# Patient Record
Sex: FEMALE | Race: WHITE | NOT HISPANIC OR LATINO
[De-identification: names, ages, dates, MRNs, and addresses within clinical notes are randomized per-mention and may not be internally consistent; named-entity substitution may affect disease eponyms.]

---

## 2018-12-26 PROBLEM — Z00.00 ENCOUNTER FOR PREVENTIVE HEALTH EXAMINATION: Status: ACTIVE | Noted: 2018-12-26

## 2018-12-28 ENCOUNTER — APPOINTMENT (OUTPATIENT)
Dept: INTERNAL MEDICINE | Facility: CLINIC | Age: 68
End: 2018-12-28
Payer: MEDICARE

## 2018-12-28 VITALS
SYSTOLIC BLOOD PRESSURE: 110 MMHG | WEIGHT: 147 LBS | OXYGEN SATURATION: 96 % | BODY MASS INDEX: 26.05 KG/M2 | TEMPERATURE: 97.9 F | HEIGHT: 63 IN | DIASTOLIC BLOOD PRESSURE: 72 MMHG | HEART RATE: 80 BPM

## 2018-12-28 DIAGNOSIS — M19.90 UNSPECIFIED OSTEOARTHRITIS, UNSPECIFIED SITE: ICD-10-CM

## 2018-12-28 DIAGNOSIS — Z80.1 FAMILY HISTORY OF MALIGNANT NEOPLASM OF TRACHEA, BRONCHUS AND LUNG: ICD-10-CM

## 2018-12-28 DIAGNOSIS — Z83.3 FAMILY HISTORY OF DIABETES MELLITUS: ICD-10-CM

## 2018-12-28 DIAGNOSIS — Z80.6 FAMILY HISTORY OF LEUKEMIA: ICD-10-CM

## 2018-12-28 PROCEDURE — 99213 OFFICE O/P EST LOW 20 MIN: CPT

## 2019-02-07 ENCOUNTER — RECORD ABSTRACTING (OUTPATIENT)
Age: 69
End: 2019-02-07

## 2019-02-07 ENCOUNTER — NON-APPOINTMENT (OUTPATIENT)
Age: 69
End: 2019-02-07

## 2019-02-07 ENCOUNTER — APPOINTMENT (OUTPATIENT)
Dept: INTERNAL MEDICINE | Facility: CLINIC | Age: 69
End: 2019-02-07
Payer: MEDICARE

## 2019-02-07 ENCOUNTER — APPOINTMENT (OUTPATIENT)
Dept: INTERNAL MEDICINE | Facility: CLINIC | Age: 69
End: 2019-02-07

## 2019-02-07 VITALS
SYSTOLIC BLOOD PRESSURE: 112 MMHG | WEIGHT: 148 LBS | OXYGEN SATURATION: 97 % | TEMPERATURE: 97.9 F | HEIGHT: 62 IN | BODY MASS INDEX: 27.23 KG/M2 | HEART RATE: 74 BPM | DIASTOLIC BLOOD PRESSURE: 70 MMHG

## 2019-02-07 DIAGNOSIS — N64.4 MASTODYNIA: ICD-10-CM

## 2019-02-07 DIAGNOSIS — Z78.9 OTHER SPECIFIED HEALTH STATUS: ICD-10-CM

## 2019-02-07 DIAGNOSIS — S32.409A UNSPECIFIED FRACTURE OF UNSPECIFIED ACETABULUM, INITIAL ENCOUNTER FOR CLOSED FRACTURE: ICD-10-CM

## 2019-02-07 DIAGNOSIS — R07.81 PLEURODYNIA: ICD-10-CM

## 2019-02-07 DIAGNOSIS — Z87.891 PERSONAL HISTORY OF NICOTINE DEPENDENCE: ICD-10-CM

## 2019-02-07 DIAGNOSIS — M79.602 PAIN IN LEFT ARM: ICD-10-CM

## 2019-02-07 DIAGNOSIS — R42 DIZZINESS AND GIDDINESS: ICD-10-CM

## 2019-02-07 DIAGNOSIS — M25.569 PAIN IN UNSPECIFIED KNEE: ICD-10-CM

## 2019-02-07 DIAGNOSIS — T07.XXXA UNSPECIFIED MULTIPLE INJURIES, INITIAL ENCOUNTER: ICD-10-CM

## 2019-02-07 DIAGNOSIS — Z86.39 PERSONAL HISTORY OF OTHER ENDOCRINE, NUTRITIONAL AND METABOLIC DISEASE: ICD-10-CM

## 2019-02-07 DIAGNOSIS — M25.519 PAIN IN UNSPECIFIED SHOULDER: ICD-10-CM

## 2019-02-07 DIAGNOSIS — Z87.898 PERSONAL HISTORY OF OTHER SPECIFIED CONDITIONS: ICD-10-CM

## 2019-02-07 DIAGNOSIS — G89.29 PAIN IN UNSPECIFIED KNEE: ICD-10-CM

## 2019-02-07 DIAGNOSIS — M79.622 PAIN IN LEFT UPPER ARM: ICD-10-CM

## 2019-02-07 DIAGNOSIS — M25.559 PAIN IN UNSPECIFIED HIP: ICD-10-CM

## 2019-02-07 DIAGNOSIS — Z86.79 PERSONAL HISTORY OF OTHER DISEASES OF THE CIRCULATORY SYSTEM: ICD-10-CM

## 2019-02-07 DIAGNOSIS — Z87.2 PERSONAL HISTORY OF DISEASES OF THE SKIN AND SUBCUTANEOUS TISSUE: ICD-10-CM

## 2019-02-07 DIAGNOSIS — Z87.39 PERSONAL HISTORY OF OTHER DISEASES OF THE MUSCULOSKELETAL SYSTEM AND CONNECTIVE TISSUE: ICD-10-CM

## 2019-02-07 DIAGNOSIS — Z82.69 FAMILY HISTORY OF OTHER DISEASES OF THE MUSCULOSKELETAL SYSTEM AND CONNECTIVE TISSUE: ICD-10-CM

## 2019-02-07 DIAGNOSIS — Z60.2 PROBLEMS RELATED TO LIVING ALONE: ICD-10-CM

## 2019-02-07 PROCEDURE — 82270 OCCULT BLOOD FECES: CPT

## 2019-02-07 PROCEDURE — 36415 COLL VENOUS BLD VENIPUNCTURE: CPT

## 2019-02-07 PROCEDURE — 99213 OFFICE O/P EST LOW 20 MIN: CPT | Mod: 25

## 2019-02-07 PROCEDURE — G0439: CPT | Mod: 25

## 2019-02-07 PROCEDURE — 93000 ELECTROCARDIOGRAM COMPLETE: CPT

## 2019-02-07 SDOH — SOCIAL STABILITY - SOCIAL INSECURITY: PROBLEMS RELATED TO LIVING ALONE: Z60.2

## 2019-02-13 ENCOUNTER — APPOINTMENT (OUTPATIENT)
Dept: CARDIOLOGY | Facility: CLINIC | Age: 69
End: 2019-02-13

## 2019-02-15 ENCOUNTER — APPOINTMENT (OUTPATIENT)
Dept: ORTHOPEDIC SURGERY | Facility: CLINIC | Age: 69
End: 2019-02-15
Payer: MEDICARE

## 2019-02-15 VITALS — WEIGHT: 147 LBS | BODY MASS INDEX: 26.05 KG/M2 | HEIGHT: 63 IN

## 2019-02-15 DIAGNOSIS — M75.112 INCOMPLETE ROTATOR CUFF TEAR OR RUPTURE OF LEFT SHOULDER, NOT SPECIFIED AS TRAUMATIC: ICD-10-CM

## 2019-02-15 DIAGNOSIS — M19.012 PRIMARY OSTEOARTHRITIS, LEFT SHOULDER: ICD-10-CM

## 2019-02-15 PROCEDURE — 20610 DRAIN/INJ JOINT/BURSA W/O US: CPT | Mod: LT

## 2019-02-15 PROCEDURE — 99203 OFFICE O/P NEW LOW 30 MIN: CPT | Mod: 25

## 2019-02-15 PROCEDURE — 73030 X-RAY EXAM OF SHOULDER: CPT | Mod: 26

## 2019-02-15 NOTE — HISTORY OF PRESENT ILLNESS
[de-identified] : Patient reports moderate lateral left shoulder pain for 2 months.   Pain with overhead activity and extension.  Tingling radiates to elbow and hand.  Pain laying on side at night.  Patient fell about 4 years ago on left side.  Patient bring MRI report of left shoulder 6/15/15.  Patient performed PT which resolved the pain. NSAID use helps pain.

## 2019-02-15 NOTE — CONSULT LETTER
[Dear  ___] : Dear  [unfilled], [Consult Letter:] : I had the pleasure of evaluating your patient, [unfilled]. [Please see my note below.] : Please see my note below. [Sincerely,] : Sincerely, [FreeTextEntry2] : Dr. Ani Sloan\par [address] [facility], [address]nd floor\[place] [address]\par Phone: [phone]\par Fax: [phone]\par  [FreeTextEntry3] : Sudhir Perrin MD

## 2019-02-15 NOTE — PHYSICAL EXAM
[Normal] : Gait: normal [UE] : Sensory: Intact in bilateral upper extremities [Bicep] : biceps 2+ and symmetric bilaterally [Rad] : radial 2+ and symmetric bilaterally [Shoulder Instab Anterior Apprehension (Crank) Test Left] : negative Apprehension test [FreeTextEntry2] : Pain on palpation\par right no\par left no\par ROM\par right 180 ER 80\par left 150 ER 40\par Strength\par right 5/5\par left 5/5\par Skin intact

## 2019-02-17 LAB
ALBUMIN SERPL ELPH-MCNC: 4.1 G/DL
ALP BLD-CCNC: 75 U/L
ALT SERPL-CCNC: 14 U/L
ANION GAP SERPL CALC-SCNC: 12 MMOL/L
AST SERPL-CCNC: 20 U/L
BASOPHILS # BLD AUTO: 0.01 K/UL
BASOPHILS NFR BLD AUTO: 0.2 %
BILIRUB SERPL-MCNC: 0.4 MG/DL
BUN SERPL-MCNC: 20 MG/DL
CALCIUM SERPL-MCNC: 9.7 MG/DL
CHLORIDE SERPL-SCNC: 105 MMOL/L
CHOLEST SERPL-MCNC: 239 MG/DL
CHOLEST/HDLC SERPL: 2 RATIO
CO2 SERPL-SCNC: 27 MMOL/L
CREAT SERPL-MCNC: 0.54 MG/DL
EOSINOPHIL # BLD AUTO: 0.11 K/UL
EOSINOPHIL NFR BLD AUTO: 1.9 %
GLUCOSE SERPL-MCNC: 103 MG/DL
HBA1C MFR BLD HPLC: 5.5 %
HCT VFR BLD CALC: 40.7 %
HDLC SERPL-MCNC: 117 MG/DL
HGB BLD-MCNC: 12.4 G/DL
IMM GRANULOCYTES NFR BLD AUTO: 0.2 %
LDLC SERPL CALC-MCNC: 111 MG/DL
LYMPHOCYTES # BLD AUTO: 2.01 K/UL
LYMPHOCYTES NFR BLD AUTO: 35 %
MAN DIFF?: NORMAL
MCHC RBC-ENTMCNC: 30.5 GM/DL
MCHC RBC-ENTMCNC: 30.7 PG
MCV RBC AUTO: 100.7 FL
MONOCYTES # BLD AUTO: 0.36 K/UL
MONOCYTES NFR BLD AUTO: 6.3 %
NEUTROPHILS # BLD AUTO: 3.25 K/UL
NEUTROPHILS NFR BLD AUTO: 56.4 %
PLATELET # BLD AUTO: 254 K/UL
POTASSIUM SERPL-SCNC: 5 MMOL/L
PROT SERPL-MCNC: 6.5 G/DL
RBC # BLD: 4.04 M/UL
RBC # FLD: 15.7 %
SODIUM SERPL-SCNC: 144 MMOL/L
TRIGL SERPL-MCNC: 56 MG/DL
TSH SERPL-ACNC: 1.62 UIU/ML
WBC # FLD AUTO: 5.75 K/UL

## 2019-02-23 ENCOUNTER — APPOINTMENT (OUTPATIENT)
Dept: INTERNAL MEDICINE | Facility: CLINIC | Age: 69
End: 2019-02-23
Payer: MEDICARE

## 2019-02-23 ENCOUNTER — LABORATORY RESULT (OUTPATIENT)
Age: 69
End: 2019-02-23

## 2019-02-23 PROCEDURE — 36415 COLL VENOUS BLD VENIPUNCTURE: CPT

## 2019-03-13 ENCOUNTER — RESULT REVIEW (OUTPATIENT)
Age: 69
End: 2019-03-13

## 2019-04-09 ENCOUNTER — MED ADMIN CHARGE (OUTPATIENT)
Age: 69
End: 2019-04-09

## 2019-04-09 ENCOUNTER — APPOINTMENT (OUTPATIENT)
Dept: INTERNAL MEDICINE | Facility: CLINIC | Age: 69
End: 2019-04-09
Payer: MEDICARE

## 2019-04-09 VITALS
HEART RATE: 83 BPM | WEIGHT: 151 LBS | DIASTOLIC BLOOD PRESSURE: 70 MMHG | SYSTOLIC BLOOD PRESSURE: 100 MMHG | HEIGHT: 63 IN | OXYGEN SATURATION: 95 % | TEMPERATURE: 97.8 F | BODY MASS INDEX: 26.75 KG/M2

## 2019-04-09 VITALS — HEART RATE: 87 BPM | OXYGEN SATURATION: 99 %

## 2019-04-09 DIAGNOSIS — Z87.09 PERSONAL HISTORY OF OTHER DISEASES OF THE RESPIRATORY SYSTEM: ICD-10-CM

## 2019-04-09 LAB
FOLATE SERPL-MCNC: 13.3 NG/ML
VIT B12 SERPL-MCNC: 627 PG/ML

## 2019-04-09 PROCEDURE — 99354: CPT

## 2019-04-09 PROCEDURE — G0442 ANNUAL ALCOHOL SCREEN 15 MIN: CPT | Mod: 59

## 2019-04-09 PROCEDURE — G0444 DEPRESSION SCREEN ANNUAL: CPT | Mod: 59

## 2019-04-09 PROCEDURE — 94640 AIRWAY INHALATION TREATMENT: CPT

## 2019-04-09 PROCEDURE — 99215 OFFICE O/P EST HI 40 MIN: CPT | Mod: 25

## 2019-04-09 RX ORDER — IPRATROPIUM BROMIDE AND ALBUTEROL SULFATE 2.5; .5 MG/3ML; MG/3ML
0.5-2.5 (3) SOLUTION RESPIRATORY (INHALATION)
Qty: 0 | Refills: 0 | Status: COMPLETED | OUTPATIENT
Start: 2019-04-09

## 2019-04-09 RX ADMIN — IPRATROPIUM BROMIDE AND ALBUTEROL SULFATE 1 MG/3ML: 2.5; .5 SOLUTION RESPIRATORY (INHALATION) at 00:00

## 2019-04-09 NOTE — HISTORY OF PRESENT ILLNESS
[FreeTextEntry8] : coughing,   nose running,   clear   rhinorhea,   eyes itchy,  eyes runny. \par sore throat. \par feel wheezy  and tight in chest. \par had similar illness in 12/18  treated by dr. amador with jessie. \par \par Patient is having coughing fits repeatedly she feels wheezing and tightness in her chest. Tightness with breathing.\par \par patient also tripped at work and injured her left foot.  no sweling.

## 2019-04-09 NOTE — PHYSICAL EXAM
[No Acute Distress] : no acute distress [Well Nourished] : well nourished [Well Developed] : well developed [Well-Appearing] : well-appearing [PERRL] : pupils equal round and reactive to light [Normal Sclera/Conjunctiva] : normal sclera/conjunctiva [EOMI] : extraocular movements intact [Normal Outer Ear/Nose] : the outer ears and nose were normal in appearance [No JVD] : no jugular venous distention [Normal Oropharynx] : the oropharynx was normal [Supple] : supple [Thyroid Normal, No Nodules] : the thyroid was normal and there were no nodules present [No Lymphadenopathy] : no lymphadenopathy [Normal Rate] : normal rate  [Normal S1, S2] : normal S1 and S2 [Regular Rhythm] : with a regular rhythm [No Murmur] : no murmur heard [No Carotid Bruits] : no carotid bruits [No Varicosities] : no varicosities [No Abdominal Bruit] : a ~M bruit was not heard ~T in the abdomen [No Edema] : there was no peripheral edema [Pedal Pulses Present] : the pedal pulses are present [No Extremity Clubbing/Cyanosis] : no extremity clubbing/cyanosis [No Palpable Aorta] : no palpable aorta [Soft] : abdomen soft [No Masses] : no abdominal mass palpated [Non Tender] : non-tender [Non-distended] : non-distended [Normal Bowel Sounds] : normal bowel sounds [Normal Posterior Cervical Nodes] : no posterior cervical lymphadenopathy [No HSM] : no HSM [Normal Anterior Cervical Nodes] : no anterior cervical lymphadenopathy [No Spinal Tenderness] : no spinal tenderness [No CVA Tenderness] : no CVA  tenderness [Grossly Normal Strength/Tone] : grossly normal strength/tone [No Rash] : no rash [No Joint Swelling] : no joint swelling [Coordination Grossly Intact] : coordination grossly intact [Normal Gait] : normal gait [No Focal Deficits] : no focal deficits [Normal Affect] : the affect was normal [Deep Tendon Reflexes (DTR)] : deep tendon reflexes were 2+ and symmetric [Normal Insight/Judgement] : insight and judgment were intact [de-identified] : Reviewed previous notes and Dr. Sloan/   Scattered wheezes and rhonchi in all lung fields with good air movement noted. [de-identified] : left foot without swelling or deformity

## 2019-04-16 ENCOUNTER — APPOINTMENT (OUTPATIENT)
Dept: INTERNAL MEDICINE | Facility: CLINIC | Age: 69
End: 2019-04-16
Payer: MEDICARE

## 2019-04-16 VITALS — DIASTOLIC BLOOD PRESSURE: 80 MMHG | SYSTOLIC BLOOD PRESSURE: 122 MMHG

## 2019-04-16 PROCEDURE — 99213 OFFICE O/P EST LOW 20 MIN: CPT

## 2019-04-17 ENCOUNTER — RESULT REVIEW (OUTPATIENT)
Age: 69
End: 2019-04-17

## 2019-04-19 NOTE — HISTORY OF PRESENT ILLNESS
[FreeTextEntry8] : finished antbiotic for bronchitis.\par \par gave nebs last visit. \par \par rx qvar,  but didn't fill because wasn't covered.\par \par back at work.   foot still hurts.  black and blue.  \par \par ankle hurts.

## 2019-04-19 NOTE — PHYSICAL EXAM
[No Acute Distress] : no acute distress [Well Nourished] : well nourished [Well Developed] : well developed [de-identified] : black and blue on distal foot.  ankle full rom,  no swelling or pain

## 2019-06-07 PROBLEM — R42 DIZZINESS: Status: ACTIVE | Noted: 2019-02-07

## 2019-06-07 LAB
DATE COLLECTED: NORMAL
HEMOCCULT SP1 STL QL: NEGATIVE
QUALITY CONTROL: YES

## 2019-06-07 NOTE — REVIEW OF SYSTEMS
[Sore Throat] : sore throat [Cough] : cough [Chills] : chills [Fatigue] : fatigue [Fever] : no fever [Earache] : no earache [Chest Pain] : no chest pain [Postnasal Drip] : no postnasal drip [Lower Ext Edema] : no lower extremity edema [Palpitations] : no palpitations [Nausea] : no nausea [Abdominal Pain] : no abdominal pain [Diarrhea] : diarrhea [Dysuria] : no dysuria [Skin Rash] : no skin rash [Headache] : no headache [FreeTextEntry4] : she had sorethroat 1 week ago [FreeTextEntry6] : with clear mucous; says cough is wet [de-identified] : feels weak/tired

## 2019-06-07 NOTE — HISTORY OF PRESENT ILLNESS
[FreeTextEntry8] : Patient comes in c/o\par cough x 1 week,  wheezing.  She says her cough is wet\par clear mucous\par no fever \par Had chills\par had a sorethroat 1 week ago; it has  resolved\par She feels weak/tired.\par no facial pain\par

## 2019-06-07 NOTE — PHYSICAL EXAM
[No Acute Distress] : no acute distress [Normal Outer Ear/Nose] : the outer ears and nose were normal in appearance [Normal Oropharynx] : the oropharynx was normal [No Respiratory Distress] : no respiratory distress  [Normal Rate] : normal rate  [Regular Rhythm] : with a regular rhythm [Normal S1, S2] : normal S1 and S2 [No Murmur] : no murmur heard [No Edema] : there was no peripheral edema [Soft] : abdomen soft [Non Tender] : non-tender [Non-distended] : non-distended [de-identified] : scant wheezing anteriorly; otherwise lungs are clear; no rales; wet cough in office

## 2019-06-11 ENCOUNTER — APPOINTMENT (OUTPATIENT)
Dept: INTERNAL MEDICINE | Facility: CLINIC | Age: 69
End: 2019-06-11
Payer: MEDICARE

## 2019-06-11 PROCEDURE — 90732 PPSV23 VACC 2 YRS+ SUBQ/IM: CPT

## 2019-06-11 PROCEDURE — G0009: CPT

## 2019-06-25 ENCOUNTER — APPOINTMENT (OUTPATIENT)
Dept: INTERNAL MEDICINE | Facility: CLINIC | Age: 69
End: 2019-06-25

## 2019-06-25 RX ORDER — AZITHROMYCIN 250 MG/1
250 TABLET, FILM COATED ORAL
Qty: 6 | Refills: 0 | Status: DISCONTINUED | COMMUNITY
Start: 2019-04-09 | End: 2019-06-25

## 2019-08-02 ENCOUNTER — APPOINTMENT (OUTPATIENT)
Dept: INTERNAL MEDICINE | Facility: CLINIC | Age: 69
End: 2019-08-02
Payer: MEDICARE

## 2019-08-02 VITALS
SYSTOLIC BLOOD PRESSURE: 140 MMHG | HEART RATE: 78 BPM | WEIGHT: 150 LBS | TEMPERATURE: 98 F | HEIGHT: 63 IN | BODY MASS INDEX: 26.58 KG/M2 | OXYGEN SATURATION: 98 % | DIASTOLIC BLOOD PRESSURE: 80 MMHG

## 2019-08-02 PROCEDURE — 99214 OFFICE O/P EST MOD 30 MIN: CPT

## 2019-08-02 RX ORDER — DOXYCYCLINE HYCLATE 100 MG/1
100 CAPSULE ORAL TWICE DAILY
Qty: 20 | Refills: 0 | Status: COMPLETED | COMMUNITY
Start: 2019-08-02 | End: 2019-08-12

## 2019-08-02 NOTE — HISTORY OF PRESENT ILLNESS
[Other: _____] : [unfilled] [FreeTextEntry1] : Insect bite [de-identified] : Patient comes c/o that she was bitten by an Insect last night while visiting her neighbor.  She felt a sting on her legs.  Thereafter she developed itching and swelling, redness in left leg. She took Benadryl with some relief.

## 2019-08-02 NOTE — REVIEW OF SYSTEMS
[Chills] : chills [Lower Ext Edema] : lower extremity edema [Fever] : no fever [Chest Pain] : no chest pain [Shortness Of Breath] : no shortness of breath [FreeTextEntry9] : Swelling and redness in left leg [de-identified] : red bumps on legs

## 2019-08-02 NOTE — PHYSICAL EXAM
[No Lymphadenopathy] : no lymphadenopathy [No Respiratory Distress] : no respiratory distress  [Regular Rhythm] : with a regular rhythm [Normal Rate] : normal rate  [Normal S1, S2] : normal S1 and S2 [No Acute Distress] : no acute distress [de-identified] : positive edema in left leg [de-identified] : Skin is erythematous and hot to the touch; red papules on leg

## 2019-08-07 ENCOUNTER — APPOINTMENT (OUTPATIENT)
Dept: INTERNAL MEDICINE | Facility: CLINIC | Age: 69
End: 2019-08-07
Payer: MEDICARE

## 2019-08-07 ENCOUNTER — RESULT REVIEW (OUTPATIENT)
Age: 69
End: 2019-08-07

## 2019-08-07 VITALS
SYSTOLIC BLOOD PRESSURE: 120 MMHG | DIASTOLIC BLOOD PRESSURE: 82 MMHG | OXYGEN SATURATION: 98 % | HEART RATE: 78 BPM | BODY MASS INDEX: 26.58 KG/M2 | HEIGHT: 63 IN | TEMPERATURE: 98.2 F | WEIGHT: 150 LBS

## 2019-08-07 DIAGNOSIS — R60.0 LOCALIZED EDEMA: ICD-10-CM

## 2019-08-07 PROCEDURE — 99214 OFFICE O/P EST MOD 30 MIN: CPT

## 2019-08-20 NOTE — HEALTH RISK ASSESSMENT
[Good] : ~his/her~  mood as  good [No falls in past year] : Patient reported no falls in the past year [0] : 2) Feeling down, depressed, or hopeless: Not at all (0) [Patient reported mammogram was normal] : Patient reported mammogram was normal [Patient reported PAP Smear was normal] : Patient reported PAP Smear was normal [HIV test declined] : HIV test declined [Hepatitis C test declined] : Hepatitis C test declined [None] : None [Alone] : lives alone [Employed] : employed [High School] : high school [Single] : single [Feels Safe at Home] : Feels safe at home [Fully functional (bathing, dressing, toileting, transferring, walking, feeding)] : Fully functional (bathing, dressing, toileting, transferring, walking, feeding) [Fully functional (using the telephone, shopping, preparing meals, housekeeping, doing laundry, using] : Fully functional and needs no help or supervision to perform IADLs (using the telephone, shopping, preparing meals, housekeeping, doing laundry, using transportation, managing medications and managing finances) [Smoke Detector] : smoke detector [Carbon Monoxide Detector] : carbon monoxide detector [Patient reported bone density results were abnormal] : Patient reported bone density results were abnormal [Seat Belt] :  uses seat belt [Sunscreen] : uses sunscreen [Discussed at today's visit] : Advance Directives Discussed at today's visit [] : No [de-identified] : socially [Sexually Active] : not sexually active [Change in mental status noted] : No change in mental status noted [Reports changes in vision] : Reports no changes in vision [Reports changes in hearing] : Reports no changes in hearing [Reports changes in dental health] : Reports no changes in dental health [Guns at Home] : no guns at home [Travel to Developing Areas] : does not  travel to developing areas [MammogramDate] : 2018 [TB Exposure] : is not being exposed to tuberculosis [BoneDensityDate] : 2018 [PapSmearDate] : 2017 [BoneDensityComments] : Osteoporosis [ColonoscopyComments] : never perform [de-identified] : last eye exam 1 yr ago [FreeTextEntry2] :

## 2019-08-20 NOTE — PHYSICAL EXAM
[No Acute Distress] : no acute distress [PERRL] : pupils equal round and reactive to light [EOMI] : extraocular movements intact [Normal Oropharynx] : the oropharynx was normal [Normal TMs] : both tympanic membranes were normal [No JVD] : no jugular venous distention [Supple] : supple [No Lymphadenopathy] : no lymphadenopathy [Thyroid Normal, No Nodules] : the thyroid was normal and there were no nodules present [No Respiratory Distress] : no respiratory distress  [Clear to Auscultation] : lungs were clear to auscultation bilaterally [Normal Rate] : normal rate  [Regular Rhythm] : with a regular rhythm [Normal S1, S2] : normal S1 and S2 [No Murmur] : no murmur heard [No Varicosities] : no varicosities [No Edema] : there was no peripheral edema [Normal Appearance] : normal in appearance [No Nipple Discharge] : no nipple discharge [No Axillary Lymphadenopathy] : no axillary lymphadenopathy [Soft] : abdomen soft [Non Tender] : non-tender [Non-distended] : non-distended [No Masses] : no abdominal mass palpated [No HSM] : no HSM [Normal Bowel Sounds] : normal bowel sounds [Normal Sphincter Tone] : normal sphincter tone [No Mass] : no mass [No Rash] : no rash [Normal Gait] : normal gait [Coordination Grossly Intact] : coordination grossly intact [No Focal Deficits] : no focal deficits [Deep Tendon Reflexes (DTR)] : deep tendon reflexes were 2+ and symmetric [Speech Grossly Normal] : speech grossly normal [Normal Affect] : the affect was normal [Alert and Oriented x3] : oriented to person, place, and time [Normal Mood] : the mood was normal [Normal Insight/Judgement] : insight and judgment were intact [No Accessory Muscle Use] : no accessory muscle use [Pedal Pulses Present] : the pedal pulses are present [No CVA Tenderness] : no CVA  tenderness [Stool Occult Blood] : stool negative for occult blood [de-identified] : inverted nipple on left; dense tender breast tissue, inner, lower quadrant on left [de-identified] : Supine /80; P 64; Sitting 130/80, P 72 [de-identified] : tenderness on rotation and elevation of left shoulder; she has tenderness to palpation as well; she is unable to fully internal and external rotate shoulder

## 2019-08-20 NOTE — HISTORY OF PRESENT ILLNESS
[de-identified] :     68 year old lady for her Medicare Wellness Exam. 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 She was on Fosamax for Osteoporosis but stopped medication because of tingling in arms.\par  \par  \par  \par  [FreeTextEntry1] : Medicare Wellness

## 2019-08-20 NOTE — REVIEW OF SYSTEMS
[Joint Pain] : joint pain [Joint Stiffness] : joint stiffness [Dizziness] : dizziness [Chills] : no chills [Fever] : no fever [Pain] : no pain [Earache] : no earache [Hearing Loss] : no hearing loss [Sore Throat] : no sore throat [Chest Pain] : no chest pain [Palpitations] : no palpitations [Lower Ext Edema] : no lower extremity edema [Shortness Of Breath] : no shortness of breath [Wheezing] : no wheezing [Cough] : no cough [Constipation] : no constipation [Abdominal Pain] : no abdominal pain [Dysuria] : no dysuria [Melena] : no melena [Diarrhea] : diarrhea [Frequency] : no frequency [Incontinence] : no incontinence [Itching] : no itching [Skin Rash] : no skin rash [Fainting] : no fainting [Headache] : no headache [Confusion] : no confusion [Memory Loss] : no memory loss [Unsteady Walking] : no ataxia [Suicidal] : not suicidal [Insomnia] : no insomnia [Easy Bleeding] : no easy bleeding [Depression] : no depression [Anxiety] : no anxiety [Swollen Glands] : no swollen glands [Easy Bruising] : no easy bruising [FreeTextEntry3] : wears glasses; last eye exam 1 year ago [FreeTextEntry8] : knees [FreeTextEntry1] : Endocrine:no increased thirst, polyuria, polydipsia,polyphagia [de-identified] : she feels dizzy when she wakes up in the morning;  no numbness or tingling [FreeTextEntry9] : shoulder and knees

## 2020-02-20 ENCOUNTER — NON-APPOINTMENT (OUTPATIENT)
Age: 70
End: 2020-02-20

## 2020-02-20 ENCOUNTER — APPOINTMENT (OUTPATIENT)
Dept: INTERNAL MEDICINE | Facility: CLINIC | Age: 70
End: 2020-02-20
Payer: MEDICARE

## 2020-02-20 VITALS
OXYGEN SATURATION: 99 % | SYSTOLIC BLOOD PRESSURE: 126 MMHG | BODY MASS INDEX: 27.64 KG/M2 | HEIGHT: 63 IN | DIASTOLIC BLOOD PRESSURE: 80 MMHG | TEMPERATURE: 97.6 F | WEIGHT: 156 LBS | HEART RATE: 80 BPM

## 2020-02-20 DIAGNOSIS — M79.671 PAIN IN RIGHT FOOT: ICD-10-CM

## 2020-02-20 DIAGNOSIS — Z00.00 ENCOUNTER FOR GENERAL ADULT MEDICAL EXAMINATION W/OUT ABNORMAL FINDINGS: ICD-10-CM

## 2020-02-20 DIAGNOSIS — G89.29 PAIN IN LEFT SHOULDER: ICD-10-CM

## 2020-02-20 DIAGNOSIS — M79.672 PAIN IN RIGHT FOOT: ICD-10-CM

## 2020-02-20 DIAGNOSIS — G89.29 PAIN IN RIGHT FOOT: ICD-10-CM

## 2020-02-20 DIAGNOSIS — M25.512 PAIN IN LEFT SHOULDER: ICD-10-CM

## 2020-02-20 PROCEDURE — 93000 ELECTROCARDIOGRAM COMPLETE: CPT | Mod: 59

## 2020-02-20 PROCEDURE — G0442 ANNUAL ALCOHOL SCREEN 15 MIN: CPT

## 2020-02-20 PROCEDURE — 36415 COLL VENOUS BLD VENIPUNCTURE: CPT

## 2020-02-20 PROCEDURE — G0439: CPT | Mod: 25

## 2020-02-20 PROCEDURE — 99213 OFFICE O/P EST LOW 20 MIN: CPT | Mod: 25

## 2020-02-20 RX ORDER — ALBUTEROL SULFATE 90 UG/1
108 (90 BASE) AEROSOL, METERED RESPIRATORY (INHALATION)
Qty: 1 | Refills: 0 | Status: DISCONTINUED | COMMUNITY
Start: 2019-04-09 | End: 2020-02-20

## 2020-02-20 RX ORDER — BECLOMETHASONE DIPROPIONATE HFA 80 UG/1
80 AEROSOL, METERED RESPIRATORY (INHALATION) DAILY
Qty: 1 | Refills: 1 | Status: DISCONTINUED | COMMUNITY
Start: 2019-04-09 | End: 2020-02-20

## 2020-02-20 RX ORDER — MOMETASONE 50 UG/1
50 SPRAY, METERED NASAL DAILY
Qty: 1 | Refills: 5 | Status: DISCONTINUED | COMMUNITY
Start: 2019-04-09 | End: 2020-02-20

## 2020-02-20 RX ORDER — ELECTROLYTES/DEXTROSE
SOLUTION, ORAL ORAL DAILY
Refills: 0 | Status: ACTIVE | COMMUNITY
Start: 2020-02-20

## 2020-02-20 RX ORDER — MOMETASONE 50 UG/1
50 SPRAY, METERED NASAL DAILY
Qty: 1 | Refills: 1 | Status: DISCONTINUED | COMMUNITY
Start: 2019-04-09 | End: 2020-02-20

## 2020-02-21 PROBLEM — M25.512 CHRONIC LEFT SHOULDER PAIN: Status: ACTIVE | Noted: 2019-02-07

## 2020-02-21 NOTE — REVIEW OF SYSTEMS
[Fever] : no fever [Chills] : no chills [Earache] : no earache [Hearing Loss] : no hearing loss [Hoarseness] : no hoarseness [Chest Pain] : no chest pain [Palpitations] : no palpitations [Lower Ext Edema] : no lower extremity edema [Shortness Of Breath] : no shortness of breath [Cough] : no cough [Abdominal Pain] : no abdominal pain [Constipation] : no constipation [Melena] : no melena [Incontinence] : no incontinence [Dysuria] : no dysuria [Itching] : no itching [Headache] : no headache [Skin Rash] : no skin rash [Dizziness] : no dizziness [Easy Bleeding] : no easy bleeding [Swollen Glands] : no swollen glands [FreeTextEntry9] : c/o pain in left ankle; also c/o pain in soles of feet [FreeTextEntry1] : Endocrine: no increased thirst, polyuria, polydipsia

## 2020-02-21 NOTE — PHYSICAL EXAM
[PERRL] : pupils equal round and reactive to light [Well-Appearing] : well-appearing [No JVD] : no jugular venous distention [EOMI] : extraocular movements intact [Supple] : supple [No Lymphadenopathy] : no lymphadenopathy [No Respiratory Distress] : no respiratory distress  [No Accessory Muscle Use] : no accessory muscle use [Clear to Auscultation] : lungs were clear to auscultation bilaterally [Normal Rate] : normal rate  [Regular Rhythm] : with a regular rhythm [Normal S1, S2] : normal S1 and S2 [No Murmur] : no murmur heard [No Edema] : there was no peripheral edema [Normal Appearance] : normal in appearance [No Masses] : no palpable masses [No Nipple Discharge] : no nipple discharge [No Axillary Lymphadenopathy] : no axillary lymphadenopathy [Soft] : abdomen soft [Non Tender] : non-tender [Normal Bowel Sounds] : normal bowel sounds [Normal Supraclavicular Nodes] : no supraclavicular lymphadenopathy [Normal Axillary Nodes] : no axillary lymphadenopathy [Normal Posterior Cervical Nodes] : no posterior cervical lymphadenopathy [Normal Anterior Cervical Nodes] : no anterior cervical lymphadenopathy [No Rash] : no rash [Coordination Grossly Intact] : coordination grossly intact [No Focal Deficits] : no focal deficits [Normal Gait] : normal gait [Memory Grossly Normal] : memory grossly normal [Normal Affect] : the affect was normal [Alert and Oriented x3] : oriented to person, place, and time [Normal Mood] : the mood was normal [Normal Insight/Judgement] : insight and judgment were intact [de-identified] : wears glasses [de-identified] : decrease in inversion/eversion, flexion/extension of left ankle. tenderness with palpation of plantar surface of feet

## 2020-02-21 NOTE — HISTORY OF PRESENT ILLNESS
[de-identified] : 70 y/o lady h/o Osteoporosis, \par Left ankle pain/strain, s/p fall at work. followed by [facility] orthopedist -Dr. Mayberry.  Ongoing Physical Therapy.  Awaiting  approval for Pt to continue.\par Saw Pain Specialist, Dr. Cruz; now on Amitriptyline\par Pain scale 7/10.  Pain increases with weight bearing.  \par Eye exam 2019 [FreeTextEntry1] : Annual Physical

## 2020-02-21 NOTE — HEALTH RISK ASSESSMENT
[Good] : ~his/her~ current health as good [Very Good] : ~his/her~  mood as very good [Yes] : Yes [0-5] : 0-5 [1 or 2 (0 pts)] : 1 or 2 (0 points) [Never (0 pts)] : Never (0 points) [No] : In the past 12 months have you used drugs other than those required for medical reasons? No [Any fall with injury in past year] : Patient reported fall with injury in the past year [0] : 2) Feeling down, depressed, or hopeless: Not at all (0) [Patient reported mammogram was normal] : Patient reported mammogram was normal [Patient reported colonoscopy was normal] : Patient reported colonoscopy was normal [HIV test declined] : HIV test declined [Hepatitis C test declined] : Hepatitis C test declined [Change in mental status noted] : Change in mental status noted [None] : None [Alone] : lives alone [Retired] : retired [Single] : single [Feels Safe at Home] : Feels safe at home [Reports normal functional visual acuity (ie: able to read med bottle)] : Reports normal functional visual acuity [Smoke Detector] : smoke detector [Carbon Monoxide Detector] : carbon monoxide detector [Safety elements used in home] : safety elements used in home [Seat Belt] :  uses seat belt [Sunscreen] : uses sunscreen [Patient reported PAP Smear was normal] : Patient reported PAP Smear was normal [With Patient/Caregiver] : With Patient/Caregiver [] : No [Monthly or less (1 pt)] : Monthly or less (1 point) [de-identified] : occasionally  [de-identified] : none [Audit-CScore] : 1 [de-identified] : daily walks [[id number]] : 0 [Patient reported bone density results were abnormal] : Patient reported bone density results were abnormal [Behavior] : denies difficulty with behavior [Language] : denies difficulty with language [Learning/Retaining New Information] : denies difficulty learning/retaining new information [Handling Complex Tasks] : denies difficulty handling complex tasks [Reasoning] : denies difficulty with reasoning [Spatial Ability and Orientation] : denies difficulty with spatial ability and orientation [Sexually Active] : not sexually active [Reports changes in vision] : Reports no changes in vision [Reports changes in hearing] : Reports no changes in hearing [High Risk Behavior] : no high risk behavior [Reports changes in dental health] : Reports no changes in dental health [Guns at Home] : no guns at home [Travel to Developing Areas] : does not  travel to developing areas [TB Exposure] : is not being exposed to tuberculosis [Caregiver Concerns] : does not have caregiver concerns [PapSmearDate] : 12/15 [MammogramDate] : 03/19 [BoneDensityDate] : 02/18 [ColonoscopyComments] : Cologuard negative [ColonoscopyDate] : 09/19 [BoneDensityComments] : Osteoporosis [AdvancecareDate] : 02/20

## 2020-02-28 LAB
ALBUMIN SERPL ELPH-MCNC: 4.3 G/DL
ALP BLD-CCNC: 69 U/L
ALT SERPL-CCNC: 15 U/L
ANION GAP SERPL CALC-SCNC: 12 MMOL/L
APPEARANCE: CLEAR
AST SERPL-CCNC: 19 U/L
BACTERIA: NEGATIVE
BASOPHILS # BLD AUTO: 0.02 K/UL
BASOPHILS NFR BLD AUTO: 0.3 %
BILIRUB SERPL-MCNC: 0.4 MG/DL
BILIRUBIN URINE: NEGATIVE
BLOOD URINE: NEGATIVE
BUN SERPL-MCNC: 18 MG/DL
CALCIUM SERPL-MCNC: 9.4 MG/DL
CHLORIDE SERPL-SCNC: 104 MMOL/L
CHOLEST SERPL-MCNC: 251 MG/DL
CHOLEST/HDLC SERPL: 2.4 RATIO
CO2 SERPL-SCNC: 24 MMOL/L
COLOR: NORMAL
CREAT SERPL-MCNC: 0.54 MG/DL
EOSINOPHIL # BLD AUTO: 0.2 K/UL
EOSINOPHIL NFR BLD AUTO: 3.5 %
GLUCOSE QUALITATIVE U: NEGATIVE
GLUCOSE SERPL-MCNC: 96 MG/DL
HCT VFR BLD CALC: 40.1 %
HDLC SERPL-MCNC: 107 MG/DL
HGB BLD-MCNC: 12.7 G/DL
HYALINE CASTS: 1 /LPF
IMM GRANULOCYTES NFR BLD AUTO: 0.2 %
KETONES URINE: NEGATIVE
LDLC SERPL CALC-MCNC: 129 MG/DL
LEUKOCYTE ESTERASE URINE: ABNORMAL
LYMPHOCYTES # BLD AUTO: 1.94 K/UL
LYMPHOCYTES NFR BLD AUTO: 33.7 %
MAN DIFF?: NORMAL
MCHC RBC-ENTMCNC: 31.7 GM/DL
MCHC RBC-ENTMCNC: 31.8 PG
MCV RBC AUTO: 100.5 FL
MICROSCOPIC-UA: NORMAL
MONOCYTES # BLD AUTO: 0.43 K/UL
MONOCYTES NFR BLD AUTO: 7.5 %
NEUTROPHILS # BLD AUTO: 3.16 K/UL
NEUTROPHILS NFR BLD AUTO: 54.8 %
NITRITE URINE: NEGATIVE
PH URINE: 5
PLATELET # BLD AUTO: 260 K/UL
POTASSIUM SERPL-SCNC: 5 MMOL/L
PROT SERPL-MCNC: 6.6 G/DL
PROTEIN URINE: NEGATIVE
RBC # BLD: 3.99 M/UL
RBC # FLD: 15.6 %
RED BLOOD CELLS URINE: 3 /HPF
SODIUM SERPL-SCNC: 140 MMOL/L
SPECIFIC GRAVITY URINE: 1.02
SQUAMOUS EPITHELIAL CELLS: 2 /HPF
TRIGL SERPL-MCNC: 75 MG/DL
TSH SERPL-ACNC: 1.79 UIU/ML
UROBILINOGEN URINE: NORMAL
WBC # FLD AUTO: 5.76 K/UL
WHITE BLOOD CELLS URINE: 12 /HPF

## 2020-03-16 ENCOUNTER — RESULT REVIEW (OUTPATIENT)
Age: 70
End: 2020-03-16

## 2020-03-25 ENCOUNTER — APPOINTMENT (OUTPATIENT)
Dept: OBGYN | Facility: CLINIC | Age: 70
End: 2020-03-25

## 2020-04-09 PROBLEM — M25.559 HIP PAIN: Status: RESOLVED | Noted: 2019-02-07 | Resolved: 2020-04-09

## 2020-04-09 LAB — NONINV COLON CA DNA+OCC BLD SCRN STL QL: NEGATIVE

## 2020-04-28 ENCOUNTER — APPOINTMENT (OUTPATIENT)
Dept: INTERNAL MEDICINE | Facility: CLINIC | Age: 70
End: 2020-04-28
Payer: MEDICARE

## 2020-04-28 PROCEDURE — 99442: CPT

## 2020-06-24 ENCOUNTER — APPOINTMENT (OUTPATIENT)
Dept: OBGYN | Facility: CLINIC | Age: 70
End: 2020-06-24
Payer: MEDICARE

## 2020-06-24 VITALS
DIASTOLIC BLOOD PRESSURE: 70 MMHG | WEIGHT: 153 LBS | TEMPERATURE: 98.7 F | BODY MASS INDEX: 27.11 KG/M2 | SYSTOLIC BLOOD PRESSURE: 120 MMHG | HEIGHT: 63 IN

## 2020-06-24 DIAGNOSIS — Z01.419 ENCOUNTER FOR GYNECOLOGICAL EXAMINATION (GENERAL) (ROUTINE) W/OUT ABNORMAL FINDINGS: ICD-10-CM

## 2020-06-24 DIAGNOSIS — Z12.31 ENCOUNTER FOR SCREENING MAMMOGRAM FOR MALIGNANT NEOPLASM OF BREAST: ICD-10-CM

## 2020-06-24 PROCEDURE — 99387 INIT PM E/M NEW PAT 65+ YRS: CPT

## 2020-06-24 NOTE — PHYSICAL EXAM
[Awake] : awake [Alert] : alert [Acute Distress] : no acute distress [LAD] : no lymphadenopathy [Thyroid Nodule] : no thyroid nodule [Goiter] : no goiter [Mass] : no breast mass [Nipple Discharge] : no nipple discharge [Axillary LAD] : no axillary lymphadenopathy [Soft] : soft [Tender] : non tender [Oriented x3] : oriented to person, place, and time [Normal] : uterus [Labia Majora] : labia major [Labia Minora] : labia minora [Atrophy] : atrophy [Dry Mucosa] : dry mucosa [Uterine Prolapse] : no uterine prolapse [No Bleeding] : there was no active vaginal bleeding [Discharge] : had no discharge [Tenderness] : nontender [Uterine Adnexae] : were not tender and not enlarged [Adnexa Tenderness] : were not tender

## 2020-06-24 NOTE — DISCUSSION/SUMMARY
[FreeTextEntry1] : Discussed benign GYN exam. Vaginal atrophy c/w menopause. No  bleeding. History of normal paps and over age 65, explained per guidelines may stop cervical cancer screening which pt is in agreement with. Up to date with breast cancer and colon cancer screening. PCP: Dr. Ani Sloan. Pt knows to f/u with Endo for alternative osteoporosis treatment. RTC 1 year.

## 2020-06-24 NOTE — COUNSELING
[Nutrition] : nutrition [Exercise] : exercise [Vitamins/Supplements] : vitamins/supplements [Family Involvement] : family involvement

## 2020-07-10 ENCOUNTER — APPOINTMENT (OUTPATIENT)
Dept: ENDOCRINOLOGY | Facility: CLINIC | Age: 70
End: 2020-07-10
Payer: MEDICARE

## 2020-07-10 VITALS
BODY MASS INDEX: 27.11 KG/M2 | SYSTOLIC BLOOD PRESSURE: 120 MMHG | DIASTOLIC BLOOD PRESSURE: 78 MMHG | HEIGHT: 63 IN | WEIGHT: 153 LBS

## 2020-07-10 PROCEDURE — 99204 OFFICE O/P NEW MOD 45 MIN: CPT

## 2020-07-10 RX ORDER — IPRATROPIUM BROMIDE 21 UG/1
0.03 SPRAY NASAL TWICE DAILY
Qty: 1 | Refills: 1 | Status: DISCONTINUED | COMMUNITY
Start: 2020-04-28 | End: 2020-07-10

## 2020-07-10 RX ORDER — AMITRIPTYLINE HYDROCHLORIDE 10 MG/1
10 TABLET, FILM COATED ORAL AT BEDTIME
Refills: 0 | Status: DISCONTINUED | COMMUNITY
End: 2020-07-10

## 2020-07-12 NOTE — ASSESSMENT
[FreeTextEntry1] : Bone density in March shows spine T -2.7, forearm T -2.6\par Not able to tolerate oral bisphosphonate. \par Reclast is not nearly as efficacious as Prolia.\par Will request Prolia authorization from her insurance company.   Sent

## 2020-07-12 NOTE — HISTORY OF PRESENT ILLNESS
[FreeTextEntry1] : Jul 10, 2020     in person          [employer]\par \par PCP:  Dr. Ani Sloan\par \par CC:  Osteoporosis\par \par 69 yo visits because of osteoporosis.   Risk factors include past history of smoking, past history of hyperthyroidism, past history of vitamin D deficiency.     She stopped smoking, she is now euthyroid, she takes calcium with vitamin D and vitamin D levels are now in good range and have been for a few years.  She walks regularly.  She is not underweight.   She was tried in Fosamax in the past, but had to stop it because of considerable GI upset and heartburn.   She has a history of an acetabular fracture after a fall.\par Despite all of her efforts, bone density on 3/16/2020 included \par distal forearm T -2.6;   spine T -2.7 (L3 is -3.1).\par \par Impression:  Based on National Osteoporosis [employer] Guidelines, she is an appropriate candidate for pharmacologic intervention to treat the osteoporosis.    She does not tolerate oral bisphosphonates, so I will prescribe Prolia, after authorized by her insurance.   Updated vitamin D level today.\par ROV in several weeks.   Thank you.

## 2020-07-13 ENCOUNTER — RESULT REVIEW (OUTPATIENT)
Age: 70
End: 2020-07-13

## 2020-07-13 LAB
25(OH)D3 SERPL-MCNC: 36.5 NG/ML
ANION GAP SERPL CALC-SCNC: 13 MMOL/L
BUN SERPL-MCNC: 14 MG/DL
CALCIUM SERPL-MCNC: 9.4 MG/DL
CHLORIDE SERPL-SCNC: 105 MMOL/L
CO2 SERPL-SCNC: 25 MMOL/L
CREAT SERPL-MCNC: 0.57 MG/DL
GLUCOSE SERPL-MCNC: 116 MG/DL
POTASSIUM SERPL-SCNC: 4.4 MMOL/L
SODIUM SERPL-SCNC: 142 MMOL/L

## 2020-07-28 LAB — OSTEOCALCIN SERPL-MCNC: 22 NG/ML

## 2020-08-27 ENCOUNTER — APPOINTMENT (OUTPATIENT)
Dept: INTERNAL MEDICINE | Facility: CLINIC | Age: 70
End: 2020-08-27
Payer: MEDICARE

## 2020-08-27 VITALS
WEIGHT: 158 LBS | SYSTOLIC BLOOD PRESSURE: 120 MMHG | OXYGEN SATURATION: 97 % | HEIGHT: 63 IN | DIASTOLIC BLOOD PRESSURE: 72 MMHG | TEMPERATURE: 98.2 F | HEART RATE: 86 BPM | BODY MASS INDEX: 28 KG/M2

## 2020-08-27 DIAGNOSIS — T78.40XA ALLERGY, UNSPECIFIED, INITIAL ENCOUNTER: ICD-10-CM

## 2020-08-27 PROCEDURE — 99214 OFFICE O/P EST MOD 30 MIN: CPT

## 2020-08-27 RX ORDER — MULTIVIT-MIN/IRON/FOLIC ACID/K 18-600-40
600-400 CAPSULE ORAL DAILY
Refills: 0 | Status: ACTIVE | COMMUNITY
Start: 2018-12-28

## 2020-08-27 RX ORDER — DOXYCYCLINE HYCLATE 100 MG/1
100 CAPSULE ORAL TWICE DAILY
Qty: 14 | Refills: 0 | Status: COMPLETED | COMMUNITY
Start: 2020-08-27 | End: 2020-09-03

## 2020-08-29 PROBLEM — T78.40XA ALLERGIC REACTION, INITIAL ENCOUNTER: Status: ACTIVE | Noted: 2020-08-27

## 2020-09-30 ENCOUNTER — APPOINTMENT (OUTPATIENT)
Dept: INTERNAL MEDICINE | Facility: CLINIC | Age: 70
End: 2020-09-30
Payer: MEDICARE

## 2020-09-30 PROCEDURE — G0008: CPT

## 2020-09-30 PROCEDURE — 90662 IIV NO PRSV INCREASED AG IM: CPT

## 2020-10-22 PROBLEM — Z00.00 MEDICARE ANNUAL WELLNESS VISIT, SUBSEQUENT: Status: RESOLVED | Noted: 2019-02-07 | Resolved: 2020-10-22

## 2020-12-21 PROBLEM — Z87.09 HISTORY OF ACUTE BRONCHITIS: Status: RESOLVED | Noted: 2019-04-09 | Resolved: 2020-12-21

## 2020-12-23 PROBLEM — Z01.419 ENCOUNTER FOR GYNECOLOGICAL EXAMINATION WITHOUT ABNORMAL FINDING: Status: RESOLVED | Noted: 2020-06-24 | Resolved: 2020-12-23

## 2021-01-05 ENCOUNTER — APPOINTMENT (OUTPATIENT)
Dept: ENDOCRINOLOGY | Facility: CLINIC | Age: 71
End: 2021-01-05
Payer: MEDICARE

## 2021-01-05 VITALS
HEIGHT: 63 IN | OXYGEN SATURATION: 97 % | SYSTOLIC BLOOD PRESSURE: 120 MMHG | BODY MASS INDEX: 27.46 KG/M2 | DIASTOLIC BLOOD PRESSURE: 80 MMHG | WEIGHT: 155 LBS | HEART RATE: 78 BPM

## 2021-01-05 PROCEDURE — 99072 ADDL SUPL MATRL&STAF TM PHE: CPT

## 2021-01-05 PROCEDURE — 99214 OFFICE O/P EST MOD 30 MIN: CPT | Mod: 25

## 2021-01-05 PROCEDURE — 36415 COLL VENOUS BLD VENIPUNCTURE: CPT

## 2021-01-06 LAB
25(OH)D3 SERPL-MCNC: 30.1 NG/ML
ANION GAP SERPL CALC-SCNC: 13 MMOL/L
BUN SERPL-MCNC: 13 MG/DL
CALCIUM SERPL-MCNC: 8.7 MG/DL
CHLORIDE SERPL-SCNC: 104 MMOL/L
CO2 SERPL-SCNC: 24 MMOL/L
CREAT SERPL-MCNC: 0.84 MG/DL
GLUCOSE SERPL-MCNC: 99 MG/DL
POTASSIUM SERPL-SCNC: 4.3 MMOL/L
SODIUM SERPL-SCNC: 140 MMOL/L

## 2021-01-06 NOTE — HISTORY OF PRESENT ILLNESS
[FreeTextEntry1] : Jan 05, 2021   in person\par \par PCP:  Dr. Ani Sloan\par \par CC:  Osteoporosis  BD 3/16/20  T scores:  LS -2.7, TH -1.4; FN -1.9; forearm -2.8  (Hx smoking, low vit D, hyperthyroidism) \par                                acetabular fracture after a fall\par                                X-ray spine 7/13/20 (& 2015):  no compression fracture \par                                8/3/20:  Prolia #1  - well tolerated \par \par 69 yo retired  at [employer] visits because of osteoporosis.   Risk factors include past history of smoking, past history of hyperthyroidism, past history of vitamin D deficiency.     She stopped smoking, she is now euthyroid, she takes calcium with vitamin D and vitamin D levels are now in good range and have been for a few years.  She walks regularly.  She is not underweight.   She was tried in Fosamax in the past, but had to stop it because of considerable GI upset and heartburn.   She has a history of an acetabular fracture after a fall.\par \par Went for Prolia #1 on 8/3/20 and tolerated this well.\par Eligible for next Prolia on or after 2/3/21.\par Taking vitamin D ~ 1000 iu daily and calcium supplements.\par No active dental issues; last saw dentist 6 months ago.\par \par : :\par Constitutional:  Alert, well nourished, healthy appearance, no acute distress \par Eyes:  No proptosis, no stare\par Thyroid:\par Pulmonary:  No respiratory distress, no accessory muscle use; normal rate and effort\par Cardiac:  Normal rate\par Vascular: \par Endocrine:  No stigmata of Cushing’s Syndrome\par Musculoskeletal:  Normal gait, no involuntary movements\par Neurology:  No tremors\par Affect/Mood/Psych:  Oriented x 3; normal affect, normal insight/judgement, normal mood \par .\par  Impression:  Doing well on Prolia\par Plan:  Updated vit D, BMP today, \par \par \par \par \par \par Jul 10, 2020     in person          [employer]\par \par PCP:  Dr. Ani Sloan\par \par CC:  Osteoporosis\par \par 69 yo visits because of osteoporosis.   Risk factors include past history of smoking, past history of hyperthyroidism, past history of vitamin D deficiency.     She stopped smoking, she is now euthyroid, she takes calcium with vitamin D and vitamin D levels are now in good range and have been for a few years.  She walks regularly.  She is not underweight.   She was tried in Fosamax in the past, but had to stop it because of considerable GI upset and heartburn.   She has a history of an acetabular fracture after a fall.\par Despite all of her efforts, bone density on 3/16/2020 included \par distal forearm T -2.6;   spine T -2.7 (L3 is -3.1).\par \par Impression:  Based on National Osteoporosis Foundation Guidelines, she is an appropriate candidate for pharmacologic intervention to treat the osteoporosis.    She does not tolerate oral bisphosphonates, so I will prescribe Prolia, after authorized by her insurance.   Updated vitamin D level today.\par ROV in several weeks.   Thank you.

## 2021-01-13 ENCOUNTER — APPOINTMENT (OUTPATIENT)
Dept: INTERNAL MEDICINE | Facility: CLINIC | Age: 71
End: 2021-01-13
Payer: MEDICARE

## 2021-01-13 VITALS
SYSTOLIC BLOOD PRESSURE: 122 MMHG | OXYGEN SATURATION: 95 % | BODY MASS INDEX: 26.93 KG/M2 | DIASTOLIC BLOOD PRESSURE: 80 MMHG | HEART RATE: 99 BPM | WEIGHT: 152 LBS | TEMPERATURE: 97.7 F

## 2021-01-13 PROCEDURE — 99213 OFFICE O/P EST LOW 20 MIN: CPT | Mod: 25

## 2021-01-13 PROCEDURE — 36415 COLL VENOUS BLD VENIPUNCTURE: CPT

## 2021-01-13 PROCEDURE — 99072 ADDL SUPL MATRL&STAF TM PHE: CPT

## 2021-01-13 RX ORDER — METHYLPREDNISOLONE 4 MG/1
4 TABLET ORAL
Qty: 1 | Refills: 0 | Status: DISCONTINUED | COMMUNITY
Start: 2020-08-27 | End: 2021-01-13

## 2021-01-19 NOTE — HISTORY OF PRESENT ILLNESS
[FreeTextEntry1] : Needs COVID-19 Antibody test [de-identified] : On December 30, 2020 had chills\par positive fatigue, fatigue.\par She had cough with clear sputum\par She thinks she was exposed to COVID-19 positive patient.  A Friend visited her that day.  Of note her Friend tested positive for the COVID-19 PCR prior to a Colonoscopy at the beginning of January.\par

## 2021-01-19 NOTE — PHYSICAL EXAM
[No Acute Distress] : no acute distress [Normal Outer Ear/Nose] : the outer ears and nose were normal in appearance [No JVD] : no jugular venous distention [No Lymphadenopathy] : no lymphadenopathy [No Respiratory Distress] : no respiratory distress  [Normal Rate] : normal rate  [Regular Rhythm] : with a regular rhythm [Normal S1, S2] : normal S1 and S2 [Well-Appearing] : well-appearing [No Edema] : there was no peripheral edema [Soft] : abdomen soft [Non Tender] : non-tender [Normal Bowel Sounds] : normal bowel sounds

## 2021-01-19 NOTE — REVIEW OF SYSTEMS
[Chills] : chills [Fatigue] : fatigue [Chest Pain] : no chest pain [Palpitations] : no palpitations [Shortness Of Breath] : no shortness of breath [Wheezing] : no wheezing [Cough] : no cough [Abdominal Pain] : no abdominal pain [Diarrhea] : diarrhea [Dysuria] : no dysuria [Joint Pain] : no joint pain [Joint Stiffness] : no joint stiffness

## 2021-01-21 LAB
SARS-COV-2 IGG SERPL IA-ACNC: 5.89 INDEX
SARS-COV-2 IGG SERPL QL IA: POSITIVE

## 2021-02-23 ENCOUNTER — APPOINTMENT (OUTPATIENT)
Dept: INTERNAL MEDICINE | Facility: CLINIC | Age: 71
End: 2021-02-23
Payer: MEDICARE

## 2021-02-23 ENCOUNTER — NON-APPOINTMENT (OUTPATIENT)
Age: 71
End: 2021-02-23

## 2021-02-23 ENCOUNTER — RESULT REVIEW (OUTPATIENT)
Age: 71
End: 2021-02-23

## 2021-02-23 VITALS
OXYGEN SATURATION: 98 % | SYSTOLIC BLOOD PRESSURE: 110 MMHG | HEART RATE: 77 BPM | TEMPERATURE: 98.8 F | WEIGHT: 154 LBS | HEIGHT: 63 IN | DIASTOLIC BLOOD PRESSURE: 80 MMHG | BODY MASS INDEX: 27.29 KG/M2

## 2021-02-23 DIAGNOSIS — R06.2 WHEEZING: ICD-10-CM

## 2021-02-23 DIAGNOSIS — M25.562 PAIN IN LEFT KNEE: ICD-10-CM

## 2021-02-23 PROCEDURE — G0444 DEPRESSION SCREEN ANNUAL: CPT

## 2021-02-23 PROCEDURE — 36415 COLL VENOUS BLD VENIPUNCTURE: CPT

## 2021-02-23 PROCEDURE — G0439: CPT

## 2021-02-23 PROCEDURE — 99212 OFFICE O/P EST SF 10 MIN: CPT | Mod: 25

## 2021-02-23 PROCEDURE — 93000 ELECTROCARDIOGRAM COMPLETE: CPT | Mod: 59

## 2021-02-23 PROCEDURE — 99072 ADDL SUPL MATRL&STAF TM PHE: CPT

## 2021-02-25 ENCOUNTER — NON-APPOINTMENT (OUTPATIENT)
Age: 71
End: 2021-02-25

## 2021-02-25 PROBLEM — R06.2 WHEEZING: Status: ACTIVE | Noted: 2021-02-23

## 2021-02-25 NOTE — HEALTH RISK ASSESSMENT
[Very Good] : ~his/her~  mood as very good [0-5] : 0-5 [Yes] : Yes [2 - 3 times a week (3 pts)] : 2 - 3  times a week (3 points) [1 or 2 (0 pts)] : 1 or 2 (0 points) [Never (0 pts)] : Never (0 points) [No] : In the past 12 months have you used drugs other than those required for medical reasons? No [No falls in past year] : Patient reported no falls in the past year [0] : 2) Feeling down, depressed, or hopeless: Not at all (0) [None] : None [Alone] : lives alone [Retired] : retired [Single] : single [Feels Safe at Home] : Feels safe at home [Reports normal functional visual acuity (ie: able to read med bottle)] : Reports normal functional visual acuity [Patient reported mammogram was normal] : Patient reported mammogram was normal [Patient reported PAP Smear was normal] : Patient reported PAP Smear was normal [Patient reported bone density results were abnormal] : Patient reported bone density results were abnormal [] : No [Audit-CScore] : 0 [de-identified] : daily walking  [de-identified] : chicken and fish a lot of veggies. Some sweets here and there. [[id number]] : 0 [HIV test declined] : HIV test declined [Hepatitis C test declined] : Hepatitis C test declined [Change in mental status noted] : No change in mental status noted [Language] : denies difficulty with language [Behavior] : denies difficulty with behavior [Learning/Retaining New Information] : denies difficulty learning/retaining new information [Handling Complex Tasks] : denies difficulty handling complex tasks [Reasoning] : denies difficulty with reasoning [Spatial Ability and Orientation] : denies difficulty with spatial ability and orientation [Reports changes in hearing] : Reports no changes in hearing [Reports changes in vision] : Reports no changes in vision [Reports changes in dental health] : Reports no changes in dental health [MammogramDate] : 03/20 [PapSmearDate] : 06/20 [BoneDensityDate] : 03/20 [BoneDensityComments] : Osteoporosis [ColonoscopyDate] : 09/19 [ColonoscopyComments] : Real two years ago

## 2021-02-25 NOTE — PHYSICAL EXAM
[No Acute Distress] : no acute distress [Normal Sclera/Conjunctiva] : normal sclera/conjunctiva [PERRL] : pupils equal round and reactive to light [EOMI] : extraocular movements intact [Normal TMs] : both tympanic membranes were normal [No JVD] : no jugular venous distention [No Lymphadenopathy] : no lymphadenopathy [No Respiratory Distress] : no respiratory distress  [Clear to Auscultation] : lungs were clear to auscultation bilaterally [Normal Rate] : normal rate  [Regular Rhythm] : with a regular rhythm [Normal S1, S2] : normal S1 and S2 [No Murmur] : no murmur heard [No Carotid Bruits] : no carotid bruits [Pedal Pulses Present] : the pedal pulses are present [No Edema] : there was no peripheral edema [No Masses] : no palpable masses [No Nipple Discharge] : no nipple discharge [No Axillary Lymphadenopathy] : no axillary lymphadenopathy [Soft] : abdomen soft [Non Tender] : non-tender [Normal Bowel Sounds] : normal bowel sounds [Normal Supraclavicular Nodes] : no supraclavicular lymphadenopathy [Normal Axillary Nodes] : no axillary lymphadenopathy [Normal Posterior Cervical Nodes] : no posterior cervical lymphadenopathy [Normal Anterior Cervical Nodes] : no anterior cervical lymphadenopathy [No CVA Tenderness] : no CVA  tenderness [No Spinal Tenderness] : no spinal tenderness [No Rash] : no rash [Normal] : normal gait, coordination grossly intact, no focal deficits and deep tendon reflexes were 2+ and symmetric [de-identified] : crepitus, edema, tenderness with palpation of left knee

## 2021-02-25 NOTE — REVIEW OF SYSTEMS
[Postnasal Drip] : postnasal drip [Shortness Of Breath] : shortness of breath [Wheezing] : wheezing [Cough] : cough [Joint Pain] : joint pain [Joint Swelling] : joint swelling [Fever] : no fever [Chills] : no chills [Fatigue] : no fatigue [Earache] : no earache [Hoarseness] : no hoarseness [Sore Throat] : no sore throat [Chest Pain] : no chest pain [Palpitations] : no palpitations [Lower Ext Edema] : no lower extremity edema [Abdominal Pain] : no abdominal pain [Nausea] : no nausea [Diarrhea] : diarrhea [Vomiting] : no vomiting [Melena] : no melena [Dysuria] : no dysuria [Incontinence] : no incontinence [Itching] : no itching [Headache] : no headache [Dizziness] : no dizziness [Suicidal] : not suicidal [Depression] : no depression [Easy Bleeding] : no easy bleeding [Swollen Glands] : no swollen glands [FreeTextEntry3] : last eye exam 2020 [FreeTextEntry9] : Left Knee

## 2021-02-25 NOTE — HISTORY OF PRESENT ILLNESS
[FreeTextEntry1] : Annual Visit [de-identified] : Patient is a 70-year-old lady here for her Medicare wellness visit.  She has a history of osteoporosis and is followed closely by endocrinologist Dr. Hellerman.  She is treated with Prolia injections.  Repeat bone density due in 2022.\par She tested positive for the COVID-9 antibody in January.  Currently she is asymptomatic.\par 1. Today she is complaining of left knee pain; she plans to follow-up with orthopedic surgeon.\par 2.  She continues to complain of cough, postnasal drip and intermittent wheezing.  She states the wheezing is mostly close to her throat.

## 2021-03-01 ENCOUNTER — RESULT REVIEW (OUTPATIENT)
Age: 71
End: 2021-03-01

## 2021-03-01 LAB
25(OH)D3 SERPL-MCNC: 37 NG/ML
ALBUMIN SERPL ELPH-MCNC: 4.3 G/DL
ALP BLD-CCNC: 64 U/L
ALT SERPL-CCNC: 16 U/L
ANION GAP SERPL CALC-SCNC: 12 MMOL/L
APPEARANCE: CLEAR
AST SERPL-CCNC: 20 U/L
BACTERIA: NEGATIVE
BASOPHILS # BLD AUTO: 0.02 K/UL
BASOPHILS NFR BLD AUTO: 0.4 %
BILIRUB SERPL-MCNC: 0.5 MG/DL
BILIRUBIN URINE: NEGATIVE
BLOOD URINE: NEGATIVE
BUN SERPL-MCNC: 15 MG/DL
CALCIUM SERPL-MCNC: 9.2 MG/DL
CHLORIDE SERPL-SCNC: 103 MMOL/L
CHOLEST SERPL-MCNC: 223 MG/DL
CO2 SERPL-SCNC: 24 MMOL/L
COLOR: COLORLESS
CREAT SERPL-MCNC: 0.62 MG/DL
EOSINOPHIL # BLD AUTO: 0.25 K/UL
EOSINOPHIL NFR BLD AUTO: 4.6 %
GLUCOSE QUALITATIVE U: NEGATIVE
GLUCOSE SERPL-MCNC: 94 MG/DL
HCT VFR BLD CALC: 39.3 %
HDLC SERPL-MCNC: 100 MG/DL
HGB BLD-MCNC: 12.5 G/DL
HYALINE CASTS: 0 /LPF
IMM GRANULOCYTES NFR BLD AUTO: 0.2 %
KETONES URINE: NEGATIVE
LDLC SERPL CALC-MCNC: 108 MG/DL
LEUKOCYTE ESTERASE URINE: NEGATIVE
LYMPHOCYTES # BLD AUTO: 1.83 K/UL
LYMPHOCYTES NFR BLD AUTO: 33.6 %
MAN DIFF?: NORMAL
MCHC RBC-ENTMCNC: 31.4 PG
MCHC RBC-ENTMCNC: 31.8 GM/DL
MCV RBC AUTO: 98.7 FL
MICROSCOPIC-UA: NORMAL
MONOCYTES # BLD AUTO: 0.38 K/UL
MONOCYTES NFR BLD AUTO: 7 %
NEUTROPHILS # BLD AUTO: 2.96 K/UL
NEUTROPHILS NFR BLD AUTO: 54.2 %
NITRITE URINE: NEGATIVE
NONHDLC SERPL-MCNC: 123 MG/DL
PH URINE: 6.5
PLATELET # BLD AUTO: 248 K/UL
POTASSIUM SERPL-SCNC: 4.4 MMOL/L
PROT SERPL-MCNC: 6.4 G/DL
PROTEIN URINE: NEGATIVE
RBC # BLD: 3.98 M/UL
RBC # FLD: 15.5 %
RED BLOOD CELLS URINE: 0 /HPF
SODIUM SERPL-SCNC: 139 MMOL/L
SPECIFIC GRAVITY URINE: 1.01
SQUAMOUS EPITHELIAL CELLS: 0 /HPF
TRIGL SERPL-MCNC: 76 MG/DL
UROBILINOGEN URINE: NORMAL
WBC # FLD AUTO: 5.45 K/UL
WHITE BLOOD CELLS URINE: 0 /HPF

## 2021-03-15 ENCOUNTER — LABORATORY RESULT (OUTPATIENT)
Age: 71
End: 2021-03-15

## 2021-03-15 ENCOUNTER — APPOINTMENT (OUTPATIENT)
Dept: PULMONOLOGY | Facility: CLINIC | Age: 71
End: 2021-03-15
Payer: MEDICARE

## 2021-03-15 VITALS
SYSTOLIC BLOOD PRESSURE: 108 MMHG | TEMPERATURE: 97.1 F | HEART RATE: 70 BPM | BODY MASS INDEX: 27.11 KG/M2 | OXYGEN SATURATION: 98 % | HEIGHT: 63 IN | DIASTOLIC BLOOD PRESSURE: 72 MMHG | WEIGHT: 153 LBS

## 2021-03-15 PROCEDURE — 99204 OFFICE O/P NEW MOD 45 MIN: CPT

## 2021-03-15 PROCEDURE — 99072 ADDL SUPL MATRL&STAF TM PHE: CPT

## 2021-03-15 NOTE — REVIEW OF SYSTEMS
[Postnasal Drip] : postnasal drip [Sinus Problems] : sinus problems [Seasonal Allergies] : seasonal allergies [Negative] : Endocrine [TextBox_30] : see HPI

## 2021-03-15 NOTE — PHYSICAL EXAM
[No Acute Distress] : no acute distress [II] : Mallampati Class: II [Normal Appearance] : normal appearance [No Neck Mass] : no neck mass [Normal Rate/Rhythm] : normal rate/rhythm [Normal S1, S2] : normal s1, s2 [No Murmurs] : no murmurs [No Resp Distress] : no resp distress [No Acc Muscle Use] : no acc muscle use [Clear to Auscultation Bilaterally] : clear to auscultation bilaterally [No Abnormalities] : no abnormalities [Benign] : benign [Normal Gait] : normal gait [No Clubbing] : no clubbing [No Edema] : no edema [Normal Turgor] : normal turgor [No Focal Deficits] : no focal deficits [Oriented x3] : oriented x3 [Normal Affect] : normal affect

## 2021-03-15 NOTE — HISTORY OF PRESENT ILLNESS
[TextBox_4] : 70 year old female with osteoporosis was referred by Dr Sloan for evaluation of cough with wheezes.\par As per Dr Sloan note she was positive for Covid AB in January and she still has cough and postnasal drip with intermittent wheezes.\par She was referred to ENT and Pulmonary.\par had CXR last months: my read: normal, no infiltrates or suspicious lesions\par CBC: eos normal 250.\par \par Since childhood she had respiratory complaints in the winter time with prolonged cough after a viral infection.\par In 2018 she had chest congestion with cough and dyspnea relieved by inhalers and Z- pack.\par Since 2 years she has more complaints: coughs almost every night, with post nasal drip. has clear mucus.\par She also has wheezing. Mild chest tightness.\par She takes Albuterol with some improvement.\par She has seasonal allergies as well.\par She thought she had a flu in the beginning of the year, then she tested positive for Covid AB.\par Denies hemoptysis\par \par SHX: ex smoker 2005- < 10 pack year\par FHX: asthma

## 2021-03-18 LAB
A ALTERNATA IGE QN: <0.1 KUA/L
A FUMIGATUS IGE QN: <0.1 KUA/L
BERMUDA GRASS IGE QN: <0.1 KUA/L
BOXELDER IGE QN: <0.1 KUA/L
C HERBARUM IGE QN: <0.1 KUA/L
CALIF WALNUT IGE QN: <0.1 KUA/L
CAT DANDER IGE QN: <0.1 KUA/L
CMN PIGWEED IGE QN: <0.1 KUA/L
COMMON RAGWEED IGE QN: <0.1 KUA/L
COTTONWOOD IGE QN: <0.1 KUA/L
D FARINAE IGE QN: <0.1 KUA/L
D PTERONYSS IGE QN: <0.1 KUA/L
DEPRECATED A ALTERNATA IGE RAST QL: 0
DEPRECATED A FUMIGATUS IGE RAST QL: 0
DEPRECATED BERMUDA GRASS IGE RAST QL: 0
DEPRECATED BOXELDER IGE RAST QL: 0
DEPRECATED C HERBARUM IGE RAST QL: 0
DEPRECATED CAT DANDER IGE RAST QL: 0
DEPRECATED COMMON PIGWEED IGE RAST QL: 0
DEPRECATED COMMON RAGWEED IGE RAST QL: 0
DEPRECATED COTTONWOOD IGE RAST QL: 0
DEPRECATED D FARINAE IGE RAST QL: 0
DEPRECATED D PTERONYSS IGE RAST QL: 0
DEPRECATED DOG DANDER IGE RAST QL: 0
DEPRECATED GOOSEFOOT IGE RAST QL: 0
DEPRECATED LONDON PLANE IGE RAST QL: 0
DEPRECATED MUGWORT IGE RAST QL: 0
DEPRECATED P NOTATUM IGE RAST QL: 0
DEPRECATED RED CEDAR IGE RAST QL: 0
DEPRECATED ROACH IGE RAST QL: 0
DEPRECATED SHEEP SORREL IGE RAST QL: 0
DEPRECATED SILVER BIRCH IGE RAST QL: 0
DEPRECATED TIMOTHY IGE RAST QL: 0
DEPRECATED WHITE ASH IGE RAST QL: 0
DEPRECATED WHITE OAK IGE RAST QL: 0
DOG DANDER IGE QN: <0.1 KUA/L
GOOSEFOOT IGE QN: <0.1 KUA/L
LONDON PLANE IGE QN: <0.1 KUA/L
MUGWORT IGE QN: <0.1 KUA/L
MULBERRY (T70) CLASS: 0
MULBERRY (T70) CONC: <0.1 KUA/L
P NOTATUM IGE QN: <0.1 KUA/L
RED CEDAR IGE QN: <0.1 KUA/L
ROACH IGE QN: <0.1 KUA/L
SHEEP SORREL IGE QN: <0.1 KUA/L
SILVER BIRCH IGE QN: <0.1 KUA/L
TIMOTHY IGE QN: <0.1 KUA/L
TOTAL IGE SMQN RAST: 99 KU/L
TREE ALLERG MIX1 IGE QL: 0
WHITE ASH IGE QN: <0.1 KUA/L
WHITE ELM IGE QN: 0
WHITE ELM IGE QN: <0.1 KUA/L
WHITE OAK IGE QN: <0.1 KUA/L

## 2021-06-15 ENCOUNTER — APPOINTMENT (OUTPATIENT)
Dept: ENDOCRINOLOGY | Facility: CLINIC | Age: 71
End: 2021-06-15
Payer: MEDICARE

## 2021-06-15 ENCOUNTER — NON-APPOINTMENT (OUTPATIENT)
Age: 71
End: 2021-06-15

## 2021-06-15 VITALS
WEIGHT: 150 LBS | BODY MASS INDEX: 26.58 KG/M2 | OXYGEN SATURATION: 94 % | HEIGHT: 63 IN | HEART RATE: 78 BPM | SYSTOLIC BLOOD PRESSURE: 110 MMHG | DIASTOLIC BLOOD PRESSURE: 70 MMHG

## 2021-06-15 PROCEDURE — 99214 OFFICE O/P EST MOD 30 MIN: CPT

## 2021-06-18 NOTE — HISTORY OF PRESENT ILLNESS
[FreeTextEntry1] : Martin 15, 2021   in person ****Needs PA Prolia for Sept***\par \par PCP:  Dr. Ani Sloan\par \par CC:  Osteoporosis  BD 3/16/20  T scores:  LS -2.7, TH -1.4; FN -1.9; forearm -2.8  (Hx smoking, low vit D, hyperthyroidism) \par                                acetabular fracture after a fall\par                                X-ray spine 20 (& ):  no compression fracture \par                                8/3/20:  Prolia #1  - well tolerated \par \par 72 yo retired  at [employer] visits because of osteoporosis.   \par T scores lumbar spine -2.7;  forearm -2.8, acetabular fragility fracture.   \par She kindly brings labs from \par 21 which include \par 25 OH vit D 37  \par \par Prolia #2 was 2021     \par Prolia #3 should be  - (Oct).  (but [employer] PA for Prolia will have  by then.  \par \par \par Impression:  Tolerated Prolia #1 and #2 without problem.\par Most recent bone density  on 3/16/2020 showed L3 T score of -3.1, L2 -2.8 and total \par T for L1-L4 of -2.7  \par \par Engages in weight bearing exercise, maintaining vitamin D in good range and\par striving to take in 1000 mg of calcium daily, mostly via diet.  \par \par 2021   in person\par \par PCP:  Dr. Ani Sloan\par \par CC:  Osteoporosis  BD 3/16/20  T scores:  LS -2.7, TH -1.4; FN -1.9; forearm -2.8  (Hx smoking, low vit D, hyperthyroidism) \par                                acetabular fracture after a fall\par                                X-ray spine 20 (& ):  no compression fracture \par                                8/3/20:  Prolia #1  - well tolerated \par \par 71 yo retired  at [employer] visits because of osteoporosis.   Risk factors include past history of smoking, past history of hyperthyroidism, past history of vitamin D deficiency.     She stopped smoking, she is now euthyroid, she takes calcium with vitamin D and vitamin D levels are now in good range and have been for a few years.  She walks regularly.  She is not underweight.   She was tried in Fosamax in the past, but had to stop it because of considerable GI upset and heartburn.   She has a history of an acetabular fracture after a fall.\par \par Went for Prolia #1 on 8/3/20 and tolerated this well.\par Eligible for next Prolia on or after 2/3/21.\par Taking vitamin D ~ 1000 iu daily and calcium supplements.\par No active dental issues; last saw dentist 6 months ago.\par \par : :\par Constitutional:  Alert, well nourished, healthy appearance, no acute distress \par Eyes:  No proptosis, no stare\par Thyroid:\par Pulmonary:  No respiratory distress, no accessory muscle use; normal rate and effort\par Cardiac:  Normal rate\par Vascular: \par Endocrine:  No stigmata of Cushing’s Syndrome\par Musculoskeletal:  Normal gait, no involuntary movements\par Neurology:  No tremors\par Affect/Mood/Psych:  Oriented x 3; normal affect, normal insight/judgement, normal mood \par .\par  Impression:  Doing well on Prolia\par Plan:  Updated vit D, BMP today, \par \par \par \par \par \par Jul 10, 2020     in person          [employer]\par \par PCP:  Dr. Ani Sloan\par \par CC:  Osteoporosis\par \par 71 yo visits because of osteoporosis.   Risk factors include past history of smoking, past history of hyperthyroidism, past history of vitamin D deficiency.     She stopped smoking, she is now euthyroid, she takes calcium with vitamin D and vitamin D levels are now in good range and have been for a few years.  She walks regularly.  She is not underweight.   She was tried in Fosamax in the past, but had to stop it because of considerable GI upset and heartburn.   She has a history of an acetabular fracture after a fall.\par Despite all of her efforts, bone density on 3/16/2020 included \par distal forearm T -2.6;   spine T -2.7 (L3 is -3.1).\par \par Impression:  Based on National Osteoporosis Foundation Guidelines, she is an appropriate candidate for pharmacologic intervention to treat the osteoporosis.    She does not tolerate oral bisphosphonates, so I will prescribe Prolia, after authorized by her insurance.   Updated vitamin D level today.\par ROV in several weeks.   Thank you.

## 2021-06-24 ENCOUNTER — NON-APPOINTMENT (OUTPATIENT)
Age: 71
End: 2021-06-24

## 2021-10-15 ENCOUNTER — APPOINTMENT (OUTPATIENT)
Dept: INTERNAL MEDICINE | Facility: CLINIC | Age: 71
End: 2021-10-15
Payer: MEDICARE

## 2021-10-15 VITALS
WEIGHT: 150 LBS | SYSTOLIC BLOOD PRESSURE: 128 MMHG | HEIGHT: 63 IN | TEMPERATURE: 97.8 F | OXYGEN SATURATION: 96 % | DIASTOLIC BLOOD PRESSURE: 80 MMHG | BODY MASS INDEX: 26.58 KG/M2 | HEART RATE: 76 BPM | RESPIRATION RATE: 16 BRPM

## 2021-10-15 DIAGNOSIS — J31.0 CHRONIC RHINITIS: ICD-10-CM

## 2021-10-15 PROCEDURE — 99213 OFFICE O/P EST LOW 20 MIN: CPT

## 2021-10-15 RX ORDER — FLUTICASONE PROPIONATE 110 UG/1
110 AEROSOL, METERED RESPIRATORY (INHALATION) TWICE DAILY
Qty: 1 | Refills: 5 | Status: DISCONTINUED | COMMUNITY
Start: 2021-03-15 | End: 2021-10-15

## 2021-10-15 RX ORDER — ALBUTEROL SULFATE 90 UG/1
108 (90 BASE) INHALANT RESPIRATORY (INHALATION)
Qty: 1 | Refills: 3 | Status: DISCONTINUED | COMMUNITY
Start: 2020-04-28 | End: 2021-10-15

## 2021-10-15 RX ORDER — FLUTICASONE PROPIONATE AND SALMETEROL 100; 50 UG/1; UG/1
100-50 POWDER RESPIRATORY (INHALATION)
Qty: 1 | Refills: 5 | Status: DISCONTINUED | COMMUNITY
Start: 2021-04-09 | End: 2021-10-15

## 2021-10-15 RX ORDER — MOMETASONE FUROATE 100 UG/1
100 AEROSOL RESPIRATORY (INHALATION) TWICE DAILY
Qty: 1 | Refills: 4 | Status: DISCONTINUED | COMMUNITY
Start: 2021-04-08 | End: 2021-10-15

## 2021-10-17 NOTE — HISTORY OF PRESENT ILLNESS
[FreeTextEntry1] : Congestion [de-identified] : Patient is c/o 1 week with postnasal drip and nasal congestion. She has cough at night.\par She has been using Ipratropium  Nasal Spray and Claritin with some response.\par She endorses itching in throat, ears are clogged.\par No fever; ? Chills\par Positive headache, forehead on sides of head.

## 2021-10-17 NOTE — REVIEW OF SYSTEMS
[Fever] : no fever [Chills] : chills [Earache] : no earache [Nasal Discharge] : nasal discharge [Postnasal Drip] : postnasal drip [Chest Pain] : no chest pain [Palpitations] : no palpitations [Shortness Of Breath] : no shortness of breath [Wheezing] : no wheezing [Cough] : cough [Dysuria] : no dysuria [FreeTextEntry4] : Sinus feels clogged

## 2021-10-17 NOTE — PHYSICAL EXAM
[No Acute Distress] : no acute distress [Normal TMs] : both tympanic membranes were normal [No JVD] : no jugular venous distention [No Lymphadenopathy] : no lymphadenopathy [No Respiratory Distress] : no respiratory distress  [Clear to Auscultation] : lungs were clear to auscultation bilaterally [Normal Rate] : normal rate  [Regular Rhythm] : with a regular rhythm [Normal S1, S2] : normal S1 and S2 [No Murmur] : no murmur heard [No Edema] : there was no peripheral edema [Normal] : normal gait, coordination grossly intact, no focal deficits and deep tendon reflexes were 2+ and symmetric [de-identified] : bilateral nasal mucosal edema; tenderness with palpation of forehead

## 2021-10-25 ENCOUNTER — APPOINTMENT (OUTPATIENT)
Dept: INTERNAL MEDICINE | Facility: CLINIC | Age: 71
End: 2021-10-25
Payer: MEDICARE

## 2021-10-25 PROCEDURE — 90662 IIV NO PRSV INCREASED AG IM: CPT

## 2021-10-25 PROCEDURE — G0008: CPT

## 2021-12-16 ENCOUNTER — APPOINTMENT (OUTPATIENT)
Dept: ENDOCRINOLOGY | Facility: CLINIC | Age: 71
End: 2021-12-16

## 2022-01-06 ENCOUNTER — APPOINTMENT (OUTPATIENT)
Dept: INTERNAL MEDICINE | Facility: CLINIC | Age: 72
End: 2022-01-06
Payer: MEDICARE

## 2022-01-06 VITALS
SYSTOLIC BLOOD PRESSURE: 120 MMHG | OXYGEN SATURATION: 95 % | BODY MASS INDEX: 26.93 KG/M2 | WEIGHT: 152 LBS | TEMPERATURE: 97.8 F | HEART RATE: 110 BPM | DIASTOLIC BLOOD PRESSURE: 84 MMHG

## 2022-01-06 PROCEDURE — 36415 COLL VENOUS BLD VENIPUNCTURE: CPT

## 2022-01-06 PROCEDURE — 99214 OFFICE O/P EST MOD 30 MIN: CPT | Mod: 25

## 2022-01-07 NOTE — PHYSICAL EXAM
[No Acute Distress] : no acute distress [No JVD] : no jugular venous distention [No Lymphadenopathy] : no lymphadenopathy [No Respiratory Distress] : no respiratory distress  [Clear to Auscultation] : lungs were clear to auscultation bilaterally [Normal Rate] : normal rate  [Regular Rhythm] : with a regular rhythm [Normal S1, S2] : normal S1 and S2 [No Murmur] : no murmur heard [Soft] : abdomen soft [Normal Bowel Sounds] : normal bowel sounds [No Joint Swelling] : no joint swelling [No Rash] : no rash [de-identified] : Mild diffuse tenderness

## 2022-01-07 NOTE — HISTORY OF PRESENT ILLNESS
[FreeTextEntry1] : Diarrhea [de-identified] : Patient is a 71 year-old lady who comes in complaining of diarrhea since January 1, 2022.  She said she ate leftover food from her neighbors and developed crampy abdominal pain accompanied by multiple episodes of watery soft stool.  No blood noted.  She denies fever, chills.  None of the other people who ate the same food had abdominal pain or diarrhea.  She states that the food was leftover from the night before and was reheated.\par She has been drinking well.  She states that the frequency of the diarrhea has decreased.\par Of note, she was treated with oral antibiotics about 2-1/2 weeks prior to the onset of her illness.

## 2022-01-07 NOTE — REVIEW OF SYSTEMS
[Abdominal Pain] : abdominal pain [Diarrhea] : diarrhea [Fever] : no fever [Chills] : no chills [Fatigue] : no fatigue [Sore Throat] : no sore throat [Shortness Of Breath] : no shortness of breath [Cough] : no cough [Nausea] : no nausea [Vomiting] : no vomiting [Heartburn] : no heartburn [Melena] : no melena [Dysuria] : no dysuria [Incontinence] : no incontinence [Joint Pain] : no joint pain [Joint Stiffness] : no joint stiffness [Joint Swelling] : no joint swelling

## 2022-01-10 LAB
INFLUENZA A RESULT: NOT DETECTED
INFLUENZA B RESULT: NOT DETECTED
RESP SYN VIRUS RESULT: NOT DETECTED
SARS-COV-2 RESULT: NOT DETECTED

## 2022-01-12 LAB
ALBUMIN SERPL ELPH-MCNC: 4.4 G/DL
ALP BLD-CCNC: 75 U/L
ALT SERPL-CCNC: 13 U/L
ANION GAP SERPL CALC-SCNC: 13 MMOL/L
AST SERPL-CCNC: 17 U/L
BACTERIA STL CULT: NORMAL
BASOPHILS # BLD AUTO: 0.02 K/UL
BASOPHILS NFR BLD AUTO: 0.2 %
BILIRUB SERPL-MCNC: 0.6 MG/DL
BUN SERPL-MCNC: 9 MG/DL
C DIFF TOX B STL QL CT TISS CULT: NORMAL
CALCIUM SERPL-MCNC: 9.4 MG/DL
CHLORIDE SERPL-SCNC: 100 MMOL/L
CO2 SERPL-SCNC: 23 MMOL/L
CREAT SERPL-MCNC: 0.58 MG/DL
DEPRECATED O AND P PREP STL: NORMAL
EOSINOPHIL # BLD AUTO: 0.13 K/UL
EOSINOPHIL NFR BLD AUTO: 1.2 %
GLUCOSE SERPL-MCNC: 105 MG/DL
HCT VFR BLD CALC: 39.9 %
HGB BLD-MCNC: 12.9 G/DL
IMM GRANULOCYTES NFR BLD AUTO: 0.4 %
LYMPHOCYTES # BLD AUTO: 1.71 K/UL
LYMPHOCYTES NFR BLD AUTO: 15.4 %
Lab: NORMAL
MAN DIFF?: NORMAL
MCHC RBC-ENTMCNC: 31.7 PG
MCHC RBC-ENTMCNC: 32.3 GM/DL
MCV RBC AUTO: 98 FL
MONOCYTES # BLD AUTO: 0.75 K/UL
MONOCYTES NFR BLD AUTO: 6.8 %
NEUTROPHILS # BLD AUTO: 8.44 K/UL
NEUTROPHILS NFR BLD AUTO: 76 %
PLATELET # BLD AUTO: 263 K/UL
POTASSIUM SERPL-SCNC: 4.3 MMOL/L
PROT SERPL-MCNC: 6.7 G/DL
RBC # BLD: 4.07 M/UL
RBC # FLD: 15.2 %
SODIUM SERPL-SCNC: 136 MMOL/L
WBC # FLD AUTO: 11.09 K/UL

## 2022-03-21 ENCOUNTER — APPOINTMENT (OUTPATIENT)
Dept: INTERNAL MEDICINE | Facility: CLINIC | Age: 72
End: 2022-03-21
Payer: MEDICARE

## 2022-03-21 ENCOUNTER — NON-APPOINTMENT (OUTPATIENT)
Age: 72
End: 2022-03-21

## 2022-03-21 VITALS
HEIGHT: 63 IN | WEIGHT: 154 LBS | RESPIRATION RATE: 16 BRPM | SYSTOLIC BLOOD PRESSURE: 118 MMHG | OXYGEN SATURATION: 98 % | DIASTOLIC BLOOD PRESSURE: 74 MMHG | HEART RATE: 82 BPM | BODY MASS INDEX: 27.29 KG/M2 | TEMPERATURE: 97.9 F

## 2022-03-21 DIAGNOSIS — R92.2 INCONCLUSIVE MAMMOGRAM: ICD-10-CM

## 2022-03-21 DIAGNOSIS — Z13.1 ENCOUNTER FOR SCREENING FOR DIABETES MELLITUS: ICD-10-CM

## 2022-03-21 DIAGNOSIS — J45.909 UNSPECIFIED ASTHMA, UNCOMPLICATED: ICD-10-CM

## 2022-03-21 PROCEDURE — 93000 ELECTROCARDIOGRAM COMPLETE: CPT | Mod: 59

## 2022-03-21 PROCEDURE — 36415 COLL VENOUS BLD VENIPUNCTURE: CPT

## 2022-03-21 PROCEDURE — G0442 ANNUAL ALCOHOL SCREEN 15 MIN: CPT

## 2022-03-21 PROCEDURE — G0444 DEPRESSION SCREEN ANNUAL: CPT

## 2022-03-21 PROCEDURE — 99213 OFFICE O/P EST LOW 20 MIN: CPT | Mod: 25

## 2022-03-21 PROCEDURE — G0439: CPT

## 2022-03-21 RX ORDER — AZITHROMYCIN 250 MG/1
250 TABLET, FILM COATED ORAL
Qty: 6 | Refills: 0 | Status: DISCONTINUED | COMMUNITY
Start: 2021-10-15 | End: 2022-03-21

## 2022-03-21 NOTE — REVIEW OF SYSTEMS
[Cough] : cough [Fever] : no fever [Chills] : no chills [Discharge] : no discharge [Pain] : no pain [Redness] : no redness [Sore Throat] : no sore throat [Chest Pain] : no chest pain [Shortness Of Breath] : no shortness of breath [Wheezing] : no wheezing [Constipation] : no constipation [Abdominal Pain] : no abdominal pain [Melena] : no melena [Dysuria] : no dysuria [Incontinence] : no incontinence [Joint Pain] : no joint pain [Joint Stiffness] : no joint stiffness [Itching] : no itching [Skin Rash] : no skin rash [Headache] : no headache [Suicidal] : not suicidal [Anxiety] : no anxiety [Depression] : no depression [Easy Bleeding] : no easy bleeding [Swollen Glands] : no swollen glands [FreeTextEntry3] : last eye exam 2 years ago

## 2022-03-21 NOTE — HISTORY OF PRESENT ILLNESS
[Other: _____] : [unfilled] [FreeTextEntry1] : Annual Physical [de-identified] : 72 y/o lady here for her Annual Exam.  She has a history of osteoporosis and is being treated with Prolia injection.  \par She is followed by Dr. Hellerman, endocrinologist.\par She developed cough and wheezing post COVID and was seen by pulmonologist.  Dr. Davila prescribed Flovent and Albuterol.  She did nit continue Flovent because she did not like the taste. She continues to have cough.  She was given Montelukast by her ENT Doctor.\par

## 2022-03-21 NOTE — PHYSICAL EXAM
[No Acute Distress] : no acute distress [Normal Sclera/Conjunctiva] : normal sclera/conjunctiva [PERRL] : pupils equal round and reactive to light [EOMI] : extraocular movements intact [Normal TMs] : both tympanic membranes were normal [No JVD] : no jugular venous distention [No Lymphadenopathy] : no lymphadenopathy [No Respiratory Distress] : no respiratory distress  [Clear to Auscultation] : lungs were clear to auscultation bilaterally [Normal Rate] : normal rate  [Regular Rhythm] : with a regular rhythm [Normal S1, S2] : normal S1 and S2 [No Murmur] : no murmur heard [No Carotid Bruits] : no carotid bruits [No Edema] : there was no peripheral edema [Soft] : abdomen soft [Non Tender] : non-tender [Normal Bowel Sounds] : normal bowel sounds [Normal Axillary Nodes] : no axillary lymphadenopathy [Normal Supraclavicular Nodes] : no supraclavicular lymphadenopathy [Normal Posterior Cervical Nodes] : no posterior cervical lymphadenopathy [Normal Anterior Cervical Nodes] : no anterior cervical lymphadenopathy [No CVA Tenderness] : no CVA  tenderness [No Spinal Tenderness] : no spinal tenderness [No Joint Swelling] : no joint swelling [No Rash] : no rash [Normal] : affect was normal and insight and judgment were intact [de-identified] : dense breast tissue bilaterally

## 2022-03-21 NOTE — HEALTH RISK ASSESSMENT
[Good] : ~his/her~  mood as  good [Former] : Former [Yes] : Yes [1 or 2 (0 pts)] : 1 or 2 (0 points) [Never (0 pts)] : Never (0 points) [No] : In the past 12 months have you used drugs other than those required for medical reasons? No [No falls in past year] : Patient reported no falls in the past year [0] : 2) Feeling down, depressed, or hopeless: Not at all (0) [Patient reported mammogram was normal] : Patient reported mammogram was normal [Patient reported PAP Smear was normal] : Patient reported PAP Smear was normal [Patient reported colonoscopy was normal] : Patient reported colonoscopy was normal [HIV test declined] : HIV test declined [Hepatitis C test declined] : Hepatitis C test declined [Patient reported bone density results were abnormal] : Patient reported bone density results were abnormal [4 or more  times a week (4 pts)] : 4 or more  times a week (4 points) [PHQ-2 Negative - No further assessment needed] : PHQ-2 Negative - No further assessment needed [FreeTextEntry1] : '' I am pretty happy, I go to the senior club two times a week'' [YearQuit] : 2005 [Audit-CScore] : 4 [[id number]] : 0 [LowDoseCTScan] : N/A [EyeExamDate] : 11/2020 [MammogramDate] : 01/21 [PapSmearDate] : 2019 [BoneDensityDate] : 03/20 [ColonoscopyDate] : 09/19 [ColonoscopyComments] : Cologuard

## 2022-03-28 ENCOUNTER — RESULT REVIEW (OUTPATIENT)
Age: 72
End: 2022-03-28

## 2022-04-01 ENCOUNTER — NON-APPOINTMENT (OUTPATIENT)
Age: 72
End: 2022-04-01

## 2022-04-04 ENCOUNTER — APPOINTMENT (OUTPATIENT)
Dept: ENDOCRINOLOGY | Facility: CLINIC | Age: 72
End: 2022-04-04

## 2022-04-05 LAB
ALBUMIN SERPL ELPH-MCNC: 4.5 G/DL
ALP BLD-CCNC: 63 U/L
ALT SERPL-CCNC: 11 U/L
ANION GAP SERPL CALC-SCNC: 11 MMOL/L
APPEARANCE: CLEAR
AST SERPL-CCNC: 16 U/L
BACTERIA: NEGATIVE
BASOPHILS # BLD AUTO: 0.02 K/UL
BASOPHILS NFR BLD AUTO: 0.3 %
BILIRUB SERPL-MCNC: 0.4 MG/DL
BILIRUBIN URINE: NEGATIVE
BLOOD URINE: NEGATIVE
BUN SERPL-MCNC: 7 MG/DL
CALCIUM SERPL-MCNC: 9.7 MG/DL
CHLORIDE SERPL-SCNC: 103 MMOL/L
CHOLEST SERPL-MCNC: 260 MG/DL
CO2 SERPL-SCNC: 26 MMOL/L
COLOR: NORMAL
CREAT SERPL-MCNC: 0.5 MG/DL
EGFR: 101 ML/MIN/1.73M2
EOSINOPHIL # BLD AUTO: 0.17 K/UL
EOSINOPHIL NFR BLD AUTO: 2.8 %
ESTIMATED AVERAGE GLUCOSE: 117 MG/DL
GLUCOSE QUALITATIVE U: NEGATIVE
GLUCOSE SERPL-MCNC: 107 MG/DL
HBA1C MFR BLD HPLC: 5.7 %
HCT VFR BLD CALC: 40.9 %
HDLC SERPL-MCNC: 108 MG/DL
HGB BLD-MCNC: 12.8 G/DL
HYALINE CASTS: 0 /LPF
IMM GRANULOCYTES NFR BLD AUTO: 0.2 %
KETONES URINE: NEGATIVE
LDLC SERPL CALC-MCNC: 132 MG/DL
LEUKOCYTE ESTERASE URINE: NEGATIVE
LYMPHOCYTES # BLD AUTO: 1.83 K/UL
LYMPHOCYTES NFR BLD AUTO: 29.9 %
MAN DIFF?: NORMAL
MCHC RBC-ENTMCNC: 31.1 PG
MCHC RBC-ENTMCNC: 31.3 GM/DL
MCV RBC AUTO: 99.3 FL
MICROSCOPIC-UA: NORMAL
MONOCYTES # BLD AUTO: 0.43 K/UL
MONOCYTES NFR BLD AUTO: 7 %
NEUTROPHILS # BLD AUTO: 3.67 K/UL
NEUTROPHILS NFR BLD AUTO: 59.8 %
NITRITE URINE: NEGATIVE
NONHDLC SERPL-MCNC: 152 MG/DL
PH URINE: 6.5
PLATELET # BLD AUTO: 242 K/UL
POTASSIUM SERPL-SCNC: 4.1 MMOL/L
PROT SERPL-MCNC: 6.7 G/DL
PROTEIN URINE: NEGATIVE
RBC # BLD: 4.12 M/UL
RBC # FLD: 15.8 %
RED BLOOD CELLS URINE: 0 /HPF
SODIUM SERPL-SCNC: 140 MMOL/L
SPECIFIC GRAVITY URINE: 1
SQUAMOUS EPITHELIAL CELLS: 0 /HPF
TRIGL SERPL-MCNC: 101 MG/DL
TSH SERPL-ACNC: 1.34 UIU/ML
UROBILINOGEN URINE: NORMAL
WBC # FLD AUTO: 6.13 K/UL
WHITE BLOOD CELLS URINE: 0 /HPF

## 2022-04-09 ENCOUNTER — APPOINTMENT (OUTPATIENT)
Dept: INTERNAL MEDICINE | Facility: CLINIC | Age: 72
End: 2022-04-09
Payer: MEDICARE

## 2022-04-09 ENCOUNTER — LABORATORY RESULT (OUTPATIENT)
Age: 72
End: 2022-04-09

## 2022-04-09 DIAGNOSIS — T78.1XXA OTHER ADVERSE FOOD REACTIONS, NOT ELSEWHERE CLASSIFIED, INITIAL ENCOUNTER: ICD-10-CM

## 2022-04-09 PROCEDURE — 36415 COLL VENOUS BLD VENIPUNCTURE: CPT

## 2022-05-02 ENCOUNTER — APPOINTMENT (OUTPATIENT)
Dept: INTERNAL MEDICINE | Facility: CLINIC | Age: 72
End: 2022-05-02
Payer: MEDICARE

## 2022-05-02 VITALS
BODY MASS INDEX: 27.29 KG/M2 | HEART RATE: 76 BPM | DIASTOLIC BLOOD PRESSURE: 84 MMHG | OXYGEN SATURATION: 98 % | HEIGHT: 63 IN | WEIGHT: 154 LBS | TEMPERATURE: 97.2 F | SYSTOLIC BLOOD PRESSURE: 124 MMHG

## 2022-05-02 DIAGNOSIS — S93.409A SPRAIN OF UNSPECIFIED LIGAMENT OF UNSPECIFIED ANKLE, INITIAL ENCOUNTER: ICD-10-CM

## 2022-05-02 LAB
BARLEY IGE QN: <0.1 KUA/L
CHERRY IGE QN: <0.1 KUA/L
COW MILK IGE QN: <0.1 KUA/L
CRAB IGE QN: <0.1 KUA/L
DEPRECATED BARLEY IGE RAST QL: 0
DEPRECATED CHERRY IGE RAST QL: 0
DEPRECATED COW MILK IGE RAST QL: 0
DEPRECATED CRAB IGE RAST QL: 0
DEPRECATED EGG WHITE IGE RAST QL: 0
DEPRECATED OAT IGE RAST QL: 0
DEPRECATED PEANUT IGE RAST QL: 0
DEPRECATED RYE IGE RAST QL: 0
DEPRECATED SOYBEAN IGE RAST QL: 0
DEPRECATED WHEAT IGE RAST QL: 0
EGG WHITE IGE QN: <0.1 KUA/L
OAT IGE QN: <0.1 KUA/L
PEANUT IGE QN: <0.1 KUA/L
RYE IGE QN: <0.1 KUA/L
SOYBEAN IGE QN: <0.1 KUA/L
TOTAL IGE SMQN RAST: 50 KU/L
WHEAT IGE QN: <0.1 KUA/L

## 2022-05-02 PROCEDURE — 99213 OFFICE O/P EST LOW 20 MIN: CPT

## 2022-05-04 PROBLEM — S93.409A ANKLE SPRAIN: Status: ACTIVE | Noted: 2019-04-16

## 2022-05-25 ENCOUNTER — NON-APPOINTMENT (OUTPATIENT)
Age: 72
End: 2022-05-25

## 2022-05-25 ENCOUNTER — APPOINTMENT (OUTPATIENT)
Dept: ENDOCRINOLOGY | Facility: CLINIC | Age: 72
End: 2022-05-25
Payer: MEDICARE

## 2022-05-25 VITALS
HEIGHT: 63 IN | OXYGEN SATURATION: 97 % | DIASTOLIC BLOOD PRESSURE: 80 MMHG | BODY MASS INDEX: 27.11 KG/M2 | WEIGHT: 153 LBS | SYSTOLIC BLOOD PRESSURE: 138 MMHG | HEART RATE: 79 BPM

## 2022-05-25 PROCEDURE — 99214 OFFICE O/P EST MOD 30 MIN: CPT

## 2022-05-26 LAB
25(OH)D3 SERPL-MCNC: 28.6 NG/ML
ANION GAP SERPL CALC-SCNC: 13 MMOL/L
BUN SERPL-MCNC: 13 MG/DL
CALCIUM SERPL-MCNC: 9.5 MG/DL
CHLORIDE SERPL-SCNC: 103 MMOL/L
CO2 SERPL-SCNC: 26 MMOL/L
CREAT SERPL-MCNC: 0.53 MG/DL
EGFR: 99 ML/MIN/1.73M2
GLUCOSE SERPL-MCNC: 101 MG/DL
PHOSPHATE SERPL-MCNC: 3.7 MG/DL
POTASSIUM SERPL-SCNC: 4.2 MMOL/L
SODIUM SERPL-SCNC: 141 MMOL/L

## 2022-05-26 NOTE — HISTORY OF PRESENT ILLNESS
[FreeTextEntry1] : May 25, 2022      in person  \par \par PCP:  Dr. Ani Sloan\par \par CC:  Osteoporosis  BD 3/16/20  T scores:  LS -2.7, TH -1.4; FN -1.9; forearm -2.8  (Hx smoking, low vit D, hyperthyroidism) \par                                acetabular fracture after a fall\par                                X-ray spine 20 (& ):  no compression fracture \par                                8/3/20:  Prolia #1  - well tolerated \par \par 70 yo retired  at [employer] visits because of osteoporosis.   \par T scores lumbar spine -2.7;  forearm -2.8, acetabular fragility fracture.   \par \par Most recent BD 3/16/20 so eligible for f/u BD.  w/ "VFA"\par vit D 28.6 \par \par : :\par Constitutional:  Alert, well nourished, healthy appearance, no acute distress \par Eyes:  No proptosis, no stare\par Thyroid:\par Pulmonary:  No respiratory distress, no accessory muscle use; normal rate and effort\par Cardiac:  Normal rate\par Vascular: \par Endocrine:  No stigmata of Cushing’s Syndrome\par Musculoskeletal:  Normal gait, no involuntary movements\par Neurology:  No tremors\par Affect/Mood/Psych:  Oriented x 3; normal affect, normal insight/judgement, normal mood \par .\par \par Imp/Plan:   Continue Prolia for now.\par Updated BD\par ROV in under 6 months.\par \par \par Martin 15, 2021   in person ****Needs PA Prolia for Sept***\par \par PCP:  Dr. Ani Sloan\par \par CC:  Osteoporosis  BD 3/16/20  T scores:  LS -2.7, TH -1.4; FN -1.9; forearm -2.8  (Hx smoking, low vit D, hyperthyroidism) \par                                acetabular fracture after a fall\par                                X-ray spine 20 (& ):  no compression fracture \par                                8/3/20:  Prolia #1  - well tolerated \par \par 70 yo retired  at [employer] visits because of osteoporosis.   \par T scores lumbar spine -2.7;  forearm -2.8, acetabular fragility fracture.   \par She kindly brings labs from \par 21 which include \par 25 [place] vit D 37  \par \par Prolia #2 was 2021     \par Prolia #3 should be  - (Oct).  (but [facility] PA for Prolia will have  by then.  \par \par \par Impression:  Tolerated Prolia #1 and #2 without problem.\par Most recent bone density  on 3/16/2020 showed L3 T score of -3.1, L2 -2.8 and total \par T for L1-L4 of -2.7  \par \par Engages in weight bearing exercise, maintaining vitamin D in good range and\par striving to take in 1000 mg of calcium daily, mostly via diet.  \par \par 2021   in person\par \par PCP:  Dr. Ani Sloan\par \par CC:  Osteoporosis  BD 3/16/20  T scores:  LS -2.7, TH -1.4; FN -1.9; forearm -2.8  (Hx smoking, low vit D, hyperthyroidism) \par                                acetabular fracture after a fall\par                                X-ray spine 20 (& ):  no compression fracture \par                                8/3/20:  Prolia #1  - well tolerated \par \par 71 yo retired  at [employer] visits because of osteoporosis.   Risk factors include past history of smoking, past history of hyperthyroidism, past history of vitamin D deficiency.     She stopped smoking, she is now euthyroid, she takes calcium with vitamin D and vitamin D levels are now in good range and have been for a few years.  She walks regularly.  She is not underweight.   She was tried in Fosamax in the past, but had to stop it because of considerable GI upset and heartburn.   She has a history of an acetabular fracture after a fall.\par \par Went for Prolia #1 on 8/3/20 and tolerated this well.\par Eligible for next Prolia on or after 2/3/21.\par Taking vitamin D ~ 1000 iu daily and calcium supplements.\par No active dental issues; last saw dentist 6 months ago.\par \par : :\par Constitutional:  Alert, well nourished, healthy appearance, no acute distress \par Eyes:  No proptosis, no stare\par Thyroid:\par Pulmonary:  No respiratory distress, no accessory muscle use; normal rate and effort\par Cardiac:  Normal rate\par Vascular: \par Endocrine:  No stigmata of Cushing’s Syndrome\par Musculoskeletal:  Normal gait, no involuntary movements\par Neurology:  No tremors\par Affect/Mood/Psych:  Oriented x 3; normal affect, normal insight/judgement, normal mood \par .\par  Impression:  Doing well on Prolia\par Plan:  Updated vit D, BMP today, \par \par \par \par \par \par Jul 10, 2020     in person          H[employer]\par \par PCP:  Dr. Ani Sloan\par \par CC:  Osteoporosis\par \par 71 yo visits because of osteoporosis.   Risk factors include past history of smoking, past history of hyperthyroidism, past history of vitamin D deficiency.     She stopped smoking, she is now euthyroid, she takes calcium with vitamin D and vitamin D levels are now in good range and have been for a few years.  She walks regularly.  She is not underweight.   She was tried in Fosamax in the past, but had to stop it because of considerable GI upset and heartburn.   She has a history of an acetabular fracture after a fall.\par Despite all of her efforts, bone density on 3/16/2020 included \par distal forearm T -2.6;   spine T -2.7 (L3 is -3.1).\par \par Impression:  Based on National Osteoporosis Foundation Guidelines, she is an appropriate candidate for pharmacologic intervention to treat the osteoporosis.    She does not tolerate oral bisphosphonates, so I will prescribe Prolia, after authorized by her insurance.   Updated vitamin D level today.\par ROV in several weeks.   Thank you.

## 2022-05-28 LAB — OSTEOCALCIN SERPL-MCNC: 11.4 NG/ML

## 2022-05-31 LAB — COLLAGEN CTX SERPL-MCNC: 380 PG/ML

## 2022-06-06 ENCOUNTER — RESULT REVIEW (OUTPATIENT)
Age: 72
End: 2022-06-06

## 2022-08-31 ENCOUNTER — APPOINTMENT (OUTPATIENT)
Dept: INTERNAL MEDICINE | Facility: CLINIC | Age: 72
End: 2022-08-31

## 2022-08-31 VITALS
TEMPERATURE: 97.2 F | RESPIRATION RATE: 16 BRPM | WEIGHT: 153 LBS | BODY MASS INDEX: 27.11 KG/M2 | SYSTOLIC BLOOD PRESSURE: 124 MMHG | DIASTOLIC BLOOD PRESSURE: 82 MMHG | HEIGHT: 63 IN | OXYGEN SATURATION: 97 % | HEART RATE: 83 BPM

## 2022-08-31 DIAGNOSIS — M79.629 PAIN IN UNSPECIFIED UPPER ARM: ICD-10-CM

## 2022-08-31 PROCEDURE — 99213 OFFICE O/P EST LOW 20 MIN: CPT

## 2022-09-02 ENCOUNTER — RX RENEWAL (OUTPATIENT)
Age: 72
End: 2022-09-02

## 2022-09-02 RX ORDER — FLUTICASONE PROPIONATE 50 UG/1
50 SPRAY, METERED NASAL DAILY
Qty: 1 | Refills: 3 | Status: ACTIVE | COMMUNITY
Start: 2021-10-15 | End: 1900-01-01

## 2022-09-06 ENCOUNTER — RESULT REVIEW (OUTPATIENT)
Age: 72
End: 2022-09-06

## 2022-11-08 ENCOUNTER — NON-APPOINTMENT (OUTPATIENT)
Age: 72
End: 2022-11-08

## 2022-11-08 DIAGNOSIS — R19.5 OTHER FECAL ABNORMALITIES: ICD-10-CM

## 2022-11-21 ENCOUNTER — APPOINTMENT (OUTPATIENT)
Dept: ENDOCRINOLOGY | Facility: CLINIC | Age: 72
End: 2022-11-21

## 2022-11-29 ENCOUNTER — APPOINTMENT (OUTPATIENT)
Dept: GASTROENTEROLOGY | Facility: CLINIC | Age: 72
End: 2022-11-29

## 2022-12-10 ENCOUNTER — NON-APPOINTMENT (OUTPATIENT)
Age: 72
End: 2022-12-10

## 2022-12-15 ENCOUNTER — APPOINTMENT (OUTPATIENT)
Dept: GASTROENTEROLOGY | Facility: CLINIC | Age: 72
End: 2022-12-15

## 2022-12-15 VITALS
HEIGHT: 63 IN | SYSTOLIC BLOOD PRESSURE: 160 MMHG | HEART RATE: 94 BPM | BODY MASS INDEX: 26.22 KG/M2 | WEIGHT: 148 LBS | DIASTOLIC BLOOD PRESSURE: 90 MMHG | OXYGEN SATURATION: 95 %

## 2022-12-15 PROCEDURE — 99203 OFFICE O/P NEW LOW 30 MIN: CPT

## 2022-12-15 NOTE — HISTORY OF PRESENT ILLNESS
[FreeTextEntry1] : Ms. Ortiz is a pleasant 72F h/o arthritis, osteoporosis, asthma who is referred by Dr. Sloan for colon cancer screening.\par \par She recently underwent a Cologuard test which was positive.\par \par She has a normal brown BM daily, no blood, no black stool.  No weight change.\par \mattie Has never undergone a colonoscopy due to fear of the procedure.\par \mattie Does not smoke, drinks wine nightly.\par \mattie Has two brothers with a h/o colon cancer.

## 2022-12-15 NOTE — ASSESSMENT
[FreeTextEntry1] : Will plan on a colonoscopy for screening, positive Cologuard test.  Explained risks/benefits/alternatives including not limited to bleeding, infection, perforation, missed lesions, anesthesia complications.  Patient understands and agrees, all questions answered.  Will use a split dose miralax/gatorade prep with clears the day prior.\par \par Thank you for referring Ms. SEALS.  Please do not hesitate to call to further discuss his/her care.\par \par Note faxed to requesting MD.\par \par

## 2022-12-27 ENCOUNTER — NON-APPOINTMENT (OUTPATIENT)
Age: 72
End: 2022-12-27

## 2023-01-10 ENCOUNTER — NON-APPOINTMENT (OUTPATIENT)
Age: 73
End: 2023-01-10

## 2023-02-21 ENCOUNTER — RESULT REVIEW (OUTPATIENT)
Age: 73
End: 2023-02-21

## 2023-02-23 ENCOUNTER — RESULT REVIEW (OUTPATIENT)
Age: 73
End: 2023-02-23

## 2023-02-24 ENCOUNTER — APPOINTMENT (OUTPATIENT)
Dept: GASTROENTEROLOGY | Facility: HOSPITAL | Age: 73
End: 2023-02-24

## 2023-03-27 ENCOUNTER — APPOINTMENT (OUTPATIENT)
Dept: INTERNAL MEDICINE | Facility: CLINIC | Age: 73
End: 2023-03-27
Payer: MEDICARE

## 2023-03-27 ENCOUNTER — LABORATORY RESULT (OUTPATIENT)
Age: 73
End: 2023-03-27

## 2023-03-27 ENCOUNTER — NON-APPOINTMENT (OUTPATIENT)
Age: 73
End: 2023-03-27

## 2023-03-27 VITALS
HEART RATE: 93 BPM | BODY MASS INDEX: 25.87 KG/M2 | WEIGHT: 146 LBS | SYSTOLIC BLOOD PRESSURE: 110 MMHG | HEIGHT: 63 IN | OXYGEN SATURATION: 97 % | DIASTOLIC BLOOD PRESSURE: 82 MMHG | TEMPERATURE: 98 F | RESPIRATION RATE: 16 BRPM

## 2023-03-27 DIAGNOSIS — Z12.39 ENCOUNTER FOR OTHER SCREENING FOR MALIGNANT NEOPLASM OF BREAST: ICD-10-CM

## 2023-03-27 DIAGNOSIS — E55.9 VITAMIN D DEFICIENCY, UNSPECIFIED: ICD-10-CM

## 2023-03-27 DIAGNOSIS — E66.9 OBESITY, UNSPECIFIED: ICD-10-CM

## 2023-03-27 DIAGNOSIS — R73.03 PREDIABETES.: ICD-10-CM

## 2023-03-27 PROCEDURE — 93000 ELECTROCARDIOGRAM COMPLETE: CPT

## 2023-03-27 PROCEDURE — G0439: CPT

## 2023-03-27 PROCEDURE — 36415 COLL VENOUS BLD VENIPUNCTURE: CPT

## 2023-03-27 PROCEDURE — 99214 OFFICE O/P EST MOD 30 MIN: CPT | Mod: 25

## 2023-03-28 PROBLEM — R73.03 PREDIABETES: Status: ACTIVE | Noted: 2023-03-28

## 2023-03-28 PROBLEM — E66.9 MILD OBESITY: Status: ACTIVE | Noted: 2019-02-07

## 2023-03-28 LAB
25(OH)D3 SERPL-MCNC: 38.9 NG/ML
ALBUMIN SERPL ELPH-MCNC: 4.4 G/DL
ALP BLD-CCNC: 79 U/L
ALT SERPL-CCNC: 15 U/L
ANION GAP SERPL CALC-SCNC: 12 MMOL/L
APPEARANCE: CLEAR
AST SERPL-CCNC: 19 U/L
BACTERIA: NEGATIVE
BASOPHILS # BLD AUTO: 0.03 K/UL
BASOPHILS NFR BLD AUTO: 0.6 %
BILIRUB SERPL-MCNC: 0.4 MG/DL
BILIRUBIN URINE: NEGATIVE
BLOOD URINE: NEGATIVE
BUN SERPL-MCNC: 13 MG/DL
CALCIUM SERPL-MCNC: 10 MG/DL
CHLORIDE SERPL-SCNC: 103 MMOL/L
CHOLEST SERPL-MCNC: 236 MG/DL
CO2 SERPL-SCNC: 24 MMOL/L
COLOR: NORMAL
CREAT SERPL-MCNC: 0.56 MG/DL
EGFR: 98 ML/MIN/1.73M2
EOSINOPHIL # BLD AUTO: 0.11 K/UL
EOSINOPHIL NFR BLD AUTO: 2.1 %
FOLATE SERPL-MCNC: 15.8 NG/ML
GLUCOSE QUALITATIVE U: NEGATIVE
GLUCOSE SERPL-MCNC: 108 MG/DL
HCT VFR BLD CALC: 40.8 %
HDLC SERPL-MCNC: 103 MG/DL
HGB BLD-MCNC: 12.9 G/DL
HYALINE CASTS: 1 /LPF
IMM GRANULOCYTES NFR BLD AUTO: 0.4 %
KETONES URINE: NEGATIVE
LDLC SERPL CALC-MCNC: 119 MG/DL
LEUKOCYTE ESTERASE URINE: ABNORMAL
LYMPHOCYTES # BLD AUTO: 1.43 K/UL
LYMPHOCYTES NFR BLD AUTO: 27.3 %
MAN DIFF?: NORMAL
MCHC RBC-ENTMCNC: 31.5 PG
MCHC RBC-ENTMCNC: 31.6 GM/DL
MCV RBC AUTO: 99.5 FL
MICROSCOPIC-UA: NORMAL
MONOCYTES # BLD AUTO: 0.39 K/UL
MONOCYTES NFR BLD AUTO: 7.5 %
NEUTROPHILS # BLD AUTO: 3.25 K/UL
NEUTROPHILS NFR BLD AUTO: 62.1 %
NITRITE URINE: NEGATIVE
NONHDLC SERPL-MCNC: 133 MG/DL
PH URINE: 6.5
PLATELET # BLD AUTO: 273 K/UL
POTASSIUM SERPL-SCNC: 4.3 MMOL/L
PROT SERPL-MCNC: 6.4 G/DL
PROTEIN URINE: NORMAL
RBC # BLD: 4.1 M/UL
RBC # FLD: 15.1 %
RED BLOOD CELLS URINE: 2 /HPF
SODIUM SERPL-SCNC: 139 MMOL/L
SPECIFIC GRAVITY URINE: 1.01
SQUAMOUS EPITHELIAL CELLS: 1 /HPF
TRIGL SERPL-MCNC: 70 MG/DL
TSH SERPL-ACNC: 1.02 UIU/ML
UROBILINOGEN URINE: NORMAL
VIT B12 SERPL-MCNC: 475 PG/ML
WBC # FLD AUTO: 5.23 K/UL
WHITE BLOOD CELLS URINE: 2 /HPF

## 2023-03-29 LAB
ESTIMATED AVERAGE GLUCOSE: 117 MG/DL
HBA1C MFR BLD HPLC: 5.7 %

## 2023-04-03 ENCOUNTER — RESULT REVIEW (OUTPATIENT)
Age: 73
End: 2023-04-03

## 2023-07-21 RX ORDER — ALBUTEROL SULFATE 90 UG/1
108 (90 BASE) INHALANT RESPIRATORY (INHALATION)
Qty: 3 | Refills: 3 | Status: ACTIVE | COMMUNITY
Start: 1900-01-01 | End: 1900-01-01

## 2023-08-01 ENCOUNTER — RX RENEWAL (OUTPATIENT)
Age: 73
End: 2023-08-01

## 2023-08-08 ENCOUNTER — APPOINTMENT (OUTPATIENT)
Dept: INTERNAL MEDICINE | Facility: CLINIC | Age: 73
End: 2023-08-08
Payer: MEDICARE

## 2023-08-08 VITALS
BODY MASS INDEX: 25.87 KG/M2 | DIASTOLIC BLOOD PRESSURE: 76 MMHG | SYSTOLIC BLOOD PRESSURE: 116 MMHG | WEIGHT: 146 LBS | HEIGHT: 63 IN | RESPIRATION RATE: 16 BRPM | HEART RATE: 78 BPM | TEMPERATURE: 97.8 F | OXYGEN SATURATION: 98 %

## 2023-08-08 DIAGNOSIS — J45.20 MILD INTERMITTENT ASTHMA, UNCOMPLICATED: ICD-10-CM

## 2023-08-08 DIAGNOSIS — R21 RASH AND OTHER NONSPECIFIC SKIN ERUPTION: ICD-10-CM

## 2023-08-08 PROCEDURE — 99214 OFFICE O/P EST MOD 30 MIN: CPT

## 2023-08-08 RX ORDER — CLOTRIMAZOLE AND BETAMETHASONE DIPROPIONATE 10; .5 MG/G; MG/G
1-0.05 CREAM TOPICAL TWICE DAILY
Qty: 1 | Refills: 1 | Status: ACTIVE | COMMUNITY
Start: 2023-08-08 | End: 1900-01-01

## 2023-08-08 NOTE — PHYSICAL EXAM
[No Acute Distress] : no acute distress [No JVD] : no jugular venous distention [No Respiratory Distress] : no respiratory distress  [No Accessory Muscle Use] : no accessory muscle use [Clear to Auscultation] : lungs were clear to auscultation bilaterally [Normal Rate] : normal rate  [Regular Rhythm] : with a regular rhythm [Normal S1, S2] : normal S1 and S2 [No Murmur] : no murmur heard [de-identified] : Erythematous macules and right axilla.  Left axilla: Erythematous confluent, macular rash.  No scales.  No skin breakdown

## 2023-08-08 NOTE — REVIEW OF SYSTEMS
[Wheezing] : wheezing [Cough] : cough [Skin Rash] : skin rash [Fever] : no fever [Chills] : no chills [Chest Pain] : no chest pain [Palpitations] : no palpitations [FreeTextEntry6] : She is using Albuterol Inhaler  [de-identified] : See HPI

## 2023-08-08 NOTE — HISTORY OF PRESENT ILLNESS
[FreeTextEntry8] : Patient is c/o rash in armpits x 2 weeks. No itching Initially she noted red spots that became confluent.  She changed to non-aluminum-based deodorant.

## 2023-11-16 ENCOUNTER — APPOINTMENT (OUTPATIENT)
Dept: INTERNAL MEDICINE | Facility: CLINIC | Age: 73
End: 2023-11-16
Payer: MEDICARE

## 2023-11-16 PROCEDURE — G0008: CPT

## 2023-11-16 PROCEDURE — 90662 IIV NO PRSV INCREASED AG IM: CPT

## 2023-12-04 ENCOUNTER — RX RENEWAL (OUTPATIENT)
Age: 73
End: 2023-12-04

## 2023-12-15 LAB — B BURGDOR DNA SPEC QL NAA+PROBE: NEGATIVE

## 2024-01-23 ENCOUNTER — APPOINTMENT (OUTPATIENT)
Dept: INTERNAL MEDICINE | Facility: CLINIC | Age: 74
End: 2024-01-23
Payer: MEDICARE

## 2024-01-23 VITALS
HEART RATE: 72 BPM | HEIGHT: 63 IN | OXYGEN SATURATION: 96 % | DIASTOLIC BLOOD PRESSURE: 80 MMHG | WEIGHT: 139 LBS | TEMPERATURE: 97.8 F | SYSTOLIC BLOOD PRESSURE: 126 MMHG | BODY MASS INDEX: 24.63 KG/M2 | RESPIRATION RATE: 16 BRPM

## 2024-01-23 DIAGNOSIS — Z87.891 PERSONAL HISTORY OF NICOTINE DEPENDENCE: ICD-10-CM

## 2024-01-23 DIAGNOSIS — Z87.898 PERSONAL HISTORY OF OTHER SPECIFIED CONDITIONS: ICD-10-CM

## 2024-01-23 DIAGNOSIS — Z82.3 FAMILY HISTORY OF STROKE: ICD-10-CM

## 2024-01-23 DIAGNOSIS — L03.116 CELLULITIS OF LEFT LOWER LIMB: ICD-10-CM

## 2024-01-23 DIAGNOSIS — Z23 ENCOUNTER FOR IMMUNIZATION: ICD-10-CM

## 2024-01-23 DIAGNOSIS — Z00.00 ENCOUNTER FOR GENERAL ADULT MEDICAL EXAMINATION W/OUT ABNORMAL FINDINGS: ICD-10-CM

## 2024-01-23 DIAGNOSIS — Z12.11 ENCOUNTER FOR SCREENING FOR MALIGNANT NEOPLASM OF COLON: ICD-10-CM

## 2024-01-23 DIAGNOSIS — Z86.59 PERSONAL HISTORY OF OTHER MENTAL AND BEHAVIORAL DISORDERS: ICD-10-CM

## 2024-01-23 DIAGNOSIS — Z92.29 PERSONAL HISTORY OF OTHER DRUG THERAPY: ICD-10-CM

## 2024-01-23 DIAGNOSIS — Z87.09 PERSONAL HISTORY OF OTHER DISEASES OF THE RESPIRATORY SYSTEM: ICD-10-CM

## 2024-01-23 DIAGNOSIS — Z87.828 PERSONAL HISTORY OF OTHER (HEALED) PHYSICAL INJURY AND TRAUMA: ICD-10-CM

## 2024-01-23 DIAGNOSIS — R71.8 OTHER ABNORMALITY OF RED BLOOD CELLS: ICD-10-CM

## 2024-01-23 DIAGNOSIS — Z53.20 PROCEDURE AND TREATMENT NOT CARRIED OUT BECAUSE OF PATIENT'S DECISION FOR UNSPECIFIED REASONS: ICD-10-CM

## 2024-01-23 DIAGNOSIS — Z20.822 CONTACT WITH AND (SUSPECTED) EXPOSURE TO COVID-19: ICD-10-CM

## 2024-01-23 DIAGNOSIS — M81.0 AGE-RELATED OSTEOPOROSIS W/OUT CURRENT PATHOLOGICAL FRACTURE: ICD-10-CM

## 2024-01-23 PROCEDURE — G0444 DEPRESSION SCREEN ANNUAL: CPT | Mod: 59

## 2024-01-23 PROCEDURE — 99214 OFFICE O/P EST MOD 30 MIN: CPT | Mod: 25

## 2024-01-23 PROCEDURE — G0439: CPT

## 2024-01-23 RX ORDER — GABAPENTIN 100 MG/1
100 CAPSULE ORAL
Qty: 30 | Refills: 1 | Status: DISCONTINUED | COMMUNITY
Start: 2023-03-28 | End: 2024-01-23

## 2024-01-23 RX ORDER — BUDESONIDE AND FORMOTEROL FUMARATE DIHYDRATE 80; 4.5 UG/1; UG/1
80-4.5 AEROSOL RESPIRATORY (INHALATION) TWICE DAILY
Qty: 1 | Refills: 3 | Status: DISCONTINUED | COMMUNITY
Start: 2023-08-08 | End: 2024-01-23

## 2024-01-23 RX ORDER — DENOSUMAB 60 MG/ML
INJECTION SUBCUTANEOUS
Refills: 0 | Status: DISCONTINUED | COMMUNITY
End: 2024-01-23

## 2024-01-23 RX ORDER — ZOSTER VACCINE RECOMBINANT, ADJUVANTED 50 MCG/0.5
50 KIT INTRAMUSCULAR
Qty: 1 | Refills: 1 | Status: DISCONTINUED | COMMUNITY
Start: 2023-03-27 | End: 2024-01-23

## 2024-01-23 NOTE — HISTORY OF PRESENT ILLNESS
[FreeTextEntry1] : Patient here for annual wellness and to go over chronic medical issues. She also has an acute problem of having increased postnasal drip, cough, wheeze for about 3 weeks.  She has no fever and symptoms are overall declining.  She uses albuterol as needed.  She had some relief with Mucinex for few days recently. She is also using Flonase to her nose and Ayr nose sprays.  She does use a humidifier 2.

## 2024-01-23 NOTE — HEALTH RISK ASSESSMENT
[Very Good] : ~his/her~  mood as very good [Yes] : Yes [Monthly or less (1 pt)] : Monthly or less (1 point) [Never (0 pts)] : Never (0 points) [No] : In the past 12 months have you used drugs other than those required for medical reasons? No [No falls in past year] : Patient reported no falls in the past year [0] : 2) Feeling down, depressed, or hopeless: Not at all (0) [PHQ-2 Negative - No further assessment needed] : PHQ-2 Negative - No further assessment needed [Patient reported mammogram was normal] : Patient reported mammogram was normal [Patient reported bone density results were normal] : Patient reported bone density results were normal [Patient reported colonoscopy was normal] : Patient reported colonoscopy was normal [Retired] : retired [Single] : single [Fully functional (bathing, dressing, toileting, transferring, walking, feeding)] : Fully functional (bathing, dressing, toileting, transferring, walking, feeding) [Fully functional (using the telephone, shopping, preparing meals, housekeeping, doing laundry, using] : Fully functional and needs no help or supervision to perform IADLs (using the telephone, shopping, preparing meals, housekeeping, doing laundry, using transportation, managing medications and managing finances) [Never] : Never [1 or 2 (0 pts)] : 1 or 2 (0 points) [HIV test declined] : HIV test declined [Hepatitis C test declined] : Hepatitis C test declined [None] : None [Alone] : lives alone [# Of Children ___] : has [unfilled] children [Audit-CScore] : 2 [de-identified] : senior center: chair yoga, rachel chi, dance class  [[id number]] : 0 [Change in mental status noted] : No change in mental status noted [Language] : denies difficulty with language [Behavior] : denies difficulty with behavior [Sexually Active] : not sexually active [High Risk Behavior] : no high risk behavior [Reports changes in hearing] : Reports no changes in hearing [Reports changes in vision] : Reports no changes in vision [Reports changes in dental health] : Reports no changes in dental health [MammogramDate] : 04/23 [BoneDensityDate] : 06/22 [ColonoscopyDate] : 02/23 [ColonoscopyComments] : 5  year FU  [FreeTextEntry2] :

## 2024-01-24 LAB
25(OH)D3 SERPL-MCNC: 40.9 NG/ML
ALBUMIN SERPL ELPH-MCNC: 4.5 G/DL
ALP BLD-CCNC: 96 U/L
ALT SERPL-CCNC: 15 U/L
ANION GAP SERPL CALC-SCNC: 12 MMOL/L
APPEARANCE: CLEAR
AST SERPL-CCNC: 21 U/L
BILIRUB SERPL-MCNC: 0.4 MG/DL
BILIRUBIN URINE: NEGATIVE
BLOOD URINE: NEGATIVE
BUN SERPL-MCNC: 9 MG/DL
CALCIUM SERPL-MCNC: 9.7 MG/DL
CHLORIDE SERPL-SCNC: 101 MMOL/L
CHOLEST SERPL-MCNC: 253 MG/DL
CO2 SERPL-SCNC: 26 MMOL/L
COLOR: YELLOW
CREAT SERPL-MCNC: 0.54 MG/DL
EGFR: 98 ML/MIN/1.73M2
ESTIMATED AVERAGE GLUCOSE: 123 MG/DL
GLUCOSE QUALITATIVE U: NEGATIVE MG/DL
GLUCOSE SERPL-MCNC: 98 MG/DL
HBA1C MFR BLD HPLC: 5.9 %
HDLC SERPL-MCNC: 91 MG/DL
KETONES URINE: NEGATIVE MG/DL
LDLC SERPL CALC-MCNC: 148 MG/DL
LEUKOCYTE ESTERASE URINE: NEGATIVE
NITRITE URINE: NEGATIVE
NONHDLC SERPL-MCNC: 162 MG/DL
PH URINE: 7
POTASSIUM SERPL-SCNC: 4.3 MMOL/L
PROT SERPL-MCNC: 6.7 G/DL
PROTEIN URINE: NEGATIVE MG/DL
SODIUM SERPL-SCNC: 139 MMOL/L
SPECIFIC GRAVITY URINE: 1
TRIGL SERPL-MCNC: 86 MG/DL
UROBILINOGEN URINE: 0.2 MG/DL

## 2024-01-25 ENCOUNTER — NON-APPOINTMENT (OUTPATIENT)
Age: 74
End: 2024-01-25

## 2024-01-25 LAB
BASOPHILS # BLD AUTO: 0.02 K/UL
BASOPHILS NFR BLD AUTO: 0.3 %
EOSINOPHIL # BLD AUTO: 0.23 K/UL
EOSINOPHIL NFR BLD AUTO: 3.3 %
HCT VFR BLD CALC: 40.2 %
HGB BLD-MCNC: 13.2 G/DL
IMM GRANULOCYTES NFR BLD AUTO: 0.1 %
LYMPHOCYTES # BLD AUTO: 2.29 K/UL
LYMPHOCYTES NFR BLD AUTO: 32.4 %
MAN DIFF?: NORMAL
MCHC RBC-ENTMCNC: 31.7 PG
MCHC RBC-ENTMCNC: 32.8 GM/DL
MCV RBC AUTO: 96.6 FL
MONOCYTES # BLD AUTO: 0.5 K/UL
MONOCYTES NFR BLD AUTO: 7.1 %
NEUTROPHILS # BLD AUTO: 4.02 K/UL
NEUTROPHILS NFR BLD AUTO: 56.8 %
PLATELET # BLD AUTO: 264 K/UL
RBC # BLD: 4.16 M/UL
RBC # FLD: 14.5 %
WBC # FLD AUTO: 7.07 K/UL

## 2024-03-06 ENCOUNTER — APPOINTMENT (OUTPATIENT)
Dept: FAMILY MEDICINE | Facility: CLINIC | Age: 74
End: 2024-03-06

## 2024-03-07 ENCOUNTER — APPOINTMENT (OUTPATIENT)
Dept: INTERNAL MEDICINE | Facility: CLINIC | Age: 74
End: 2024-03-07
Payer: MEDICARE

## 2024-03-07 VITALS
RESPIRATION RATE: 16 BRPM | WEIGHT: 139 LBS | HEIGHT: 63 IN | HEART RATE: 82 BPM | TEMPERATURE: 97.5 F | SYSTOLIC BLOOD PRESSURE: 122 MMHG | DIASTOLIC BLOOD PRESSURE: 80 MMHG | OXYGEN SATURATION: 96 % | BODY MASS INDEX: 24.63 KG/M2

## 2024-03-07 DIAGNOSIS — R20.8 OTHER DISTURBANCES OF SKIN SENSATION: ICD-10-CM

## 2024-03-07 DIAGNOSIS — R09.82 POSTNASAL DRIP: ICD-10-CM

## 2024-03-07 DIAGNOSIS — J45.21 MILD INTERMITTENT ASTHMA WITH (ACUTE) EXACERBATION: ICD-10-CM

## 2024-03-07 PROCEDURE — 87804 INFLUENZA ASSAY W/OPTIC: CPT | Mod: QW

## 2024-03-07 PROCEDURE — 99214 OFFICE O/P EST MOD 30 MIN: CPT | Mod: 25

## 2024-03-07 NOTE — HISTORY OF PRESENT ILLNESS
[FreeTextEntry8] : Pt with one week PN drip, cough with non bloody mucus, malaise, rhinitis, mild HA with no focal neuro sx and no increase with cough or bending over. No GI sx. Did one COVID test at home that was negative almost a week ago, negative.

## 2024-03-25 ENCOUNTER — RESULT REVIEW (OUTPATIENT)
Age: 74
End: 2024-03-25

## 2024-04-08 RX ORDER — BUDESONIDE AND FORMOTEROL FUMARATE DIHYDRATE 80; 4.5 UG/1; UG/1
80-4.5 AEROSOL RESPIRATORY (INHALATION) TWICE DAILY
Qty: 2 | Refills: 3 | Status: DISCONTINUED | COMMUNITY
Start: 2024-01-26 | End: 2024-04-08

## 2024-04-08 RX ORDER — MONTELUKAST 10 MG/1
10 TABLET, FILM COATED ORAL
Qty: 90 | Refills: 3 | Status: DISCONTINUED | COMMUNITY
Start: 2022-09-26 | End: 2024-04-08

## 2024-05-02 ENCOUNTER — RX RENEWAL (OUTPATIENT)
Age: 74
End: 2024-05-02

## 2024-05-02 RX ORDER — GABAPENTIN 100 MG/1
100 CAPSULE ORAL TWICE DAILY
Qty: 180 | Refills: 1 | Status: ACTIVE | COMMUNITY
Start: 2024-03-07 | End: 1900-01-01

## 2024-10-17 ENCOUNTER — APPOINTMENT (OUTPATIENT)
Dept: INTERNAL MEDICINE | Facility: CLINIC | Age: 74
End: 2024-10-17
Payer: MEDICARE

## 2024-10-17 PROCEDURE — G0008: CPT

## 2024-10-17 PROCEDURE — 90662 IIV NO PRSV INCREASED AG IM: CPT

## 2024-11-08 ENCOUNTER — NON-APPOINTMENT (OUTPATIENT)
Age: 74
End: 2024-11-08

## 2024-11-08 ENCOUNTER — APPOINTMENT (OUTPATIENT)
Dept: INTERNAL MEDICINE | Facility: CLINIC | Age: 74
End: 2024-11-08
Payer: MEDICARE

## 2024-11-08 VITALS
WEIGHT: 135 LBS | BODY MASS INDEX: 23.92 KG/M2 | RESPIRATION RATE: 16 BRPM | OXYGEN SATURATION: 97 % | HEART RATE: 68 BPM | TEMPERATURE: 97.7 F | HEIGHT: 63 IN | SYSTOLIC BLOOD PRESSURE: 124 MMHG | DIASTOLIC BLOOD PRESSURE: 76 MMHG

## 2024-11-08 DIAGNOSIS — R00.2 PALPITATIONS: ICD-10-CM

## 2024-11-08 DIAGNOSIS — F32.A DEPRESSION, UNSPECIFIED: ICD-10-CM

## 2024-11-08 DIAGNOSIS — R63.4 ABNORMAL WEIGHT LOSS: ICD-10-CM

## 2024-11-08 PROCEDURE — 93000 ELECTROCARDIOGRAM COMPLETE: CPT | Mod: 59

## 2024-11-08 PROCEDURE — 99214 OFFICE O/P EST MOD 30 MIN: CPT | Mod: 25

## 2024-11-11 LAB
ALBUMIN SERPL ELPH-MCNC: 4.2 G/DL
ALP BLD-CCNC: 79 U/L
ALT SERPL-CCNC: 14 U/L
ANION GAP SERPL CALC-SCNC: 13 MMOL/L
AST SERPL-CCNC: 19 U/L
BASOPHILS # BLD AUTO: 0.02 K/UL
BASOPHILS NFR BLD AUTO: 0.4 %
BILIRUB SERPL-MCNC: 0.5 MG/DL
BUN SERPL-MCNC: 10 MG/DL
CALCIUM SERPL-MCNC: 9.5 MG/DL
CHLORIDE SERPL-SCNC: 107 MMOL/L
CHOLEST SERPL-MCNC: 230 MG/DL
CO2 SERPL-SCNC: 23 MMOL/L
CREAT SERPL-MCNC: 0.55 MG/DL
EGFR: 97 ML/MIN/1.73M2
EOSINOPHIL # BLD AUTO: 0.17 K/UL
EOSINOPHIL NFR BLD AUTO: 3.3 %
ESTIMATED AVERAGE GLUCOSE: 111 MG/DL
FERRITIN SERPL-MCNC: 96 NG/ML
GLUCOSE SERPL-MCNC: 109 MG/DL
HBA1C MFR BLD HPLC: 5.5 %
HCT VFR BLD CALC: 36.2 %
HDLC SERPL-MCNC: 109 MG/DL
HGB BLD-MCNC: 12.1 G/DL
IMM GRANULOCYTES NFR BLD AUTO: 0.2 %
IRON SATN MFR SERPL: 32 %
IRON SERPL-MCNC: 90 UG/DL
LDLC SERPL CALC-MCNC: 112 MG/DL
LYMPHOCYTES # BLD AUTO: 1.41 K/UL
LYMPHOCYTES NFR BLD AUTO: 27.1 %
MAN DIFF?: NORMAL
MCHC RBC-ENTMCNC: 32.5 PG
MCHC RBC-ENTMCNC: 33.4 G/DL
MCV RBC AUTO: 97.3 FL
MONOCYTES # BLD AUTO: 0.39 K/UL
MONOCYTES NFR BLD AUTO: 7.5 %
NEUTROPHILS # BLD AUTO: 3.21 K/UL
NEUTROPHILS NFR BLD AUTO: 61.5 %
NONHDLC SERPL-MCNC: 121 MG/DL
PLATELET # BLD AUTO: 230 K/UL
POTASSIUM SERPL-SCNC: 4.3 MMOL/L
PROT SERPL-MCNC: 6.4 G/DL
RBC # BLD: 3.72 M/UL
RBC # FLD: 15.9 %
SODIUM SERPL-SCNC: 143 MMOL/L
T3FREE SERPL-MCNC: 2.11 PG/ML
T4 FREE SERPL-MCNC: 0.9 NG/DL
TIBC SERPL-MCNC: 286 UG/DL
TRIGL SERPL-MCNC: 56 MG/DL
TSH SERPL-ACNC: 1.33 UIU/ML
UIBC SERPL-MCNC: 196 UG/DL
WBC # FLD AUTO: 5.21 K/UL

## 2024-11-19 ENCOUNTER — APPOINTMENT (OUTPATIENT)
Dept: OBGYN | Facility: CLINIC | Age: 74
End: 2024-11-19
Payer: MEDICARE

## 2024-11-19 VITALS
HEIGHT: 63 IN | WEIGHT: 132 LBS | BODY MASS INDEX: 23.39 KG/M2 | DIASTOLIC BLOOD PRESSURE: 70 MMHG | SYSTOLIC BLOOD PRESSURE: 120 MMHG

## 2024-11-19 DIAGNOSIS — M81.0 AGE-RELATED OSTEOPOROSIS W/OUT CURRENT PATHOLOGICAL FRACTURE: ICD-10-CM

## 2024-11-19 DIAGNOSIS — Z01.419 ENCOUNTER FOR GYNECOLOGICAL EXAMINATION (GENERAL) (ROUTINE) W/OUT ABNORMAL FINDINGS: ICD-10-CM

## 2024-11-19 DIAGNOSIS — Z01.89 ENCOUNTER FOR OTHER SPECIFIED SPECIAL EXAMINATIONS: ICD-10-CM

## 2024-11-19 DIAGNOSIS — Z79.899 ENCOUNTER FOR OTHER SPECIFIED SPECIAL EXAMINATIONS: ICD-10-CM

## 2024-11-19 PROCEDURE — 99387 INIT PM E/M NEW PAT 65+ YRS: CPT | Mod: GY,25

## 2024-11-19 PROCEDURE — G0101: CPT

## 2024-11-20 LAB — HPV HIGH+LOW RISK DNA PNL CVX: NOT DETECTED

## 2024-11-27 LAB — CYTOLOGY CVX/VAG DOC THIN PREP: ABNORMAL

## 2024-12-05 ENCOUNTER — APPOINTMENT (OUTPATIENT)
Dept: GASTROENTEROLOGY | Facility: CLINIC | Age: 74
End: 2024-12-05
Payer: MEDICARE

## 2024-12-05 VITALS
HEIGHT: 63 IN | HEART RATE: 81 BPM | DIASTOLIC BLOOD PRESSURE: 72 MMHG | WEIGHT: 135 LBS | BODY MASS INDEX: 23.92 KG/M2 | SYSTOLIC BLOOD PRESSURE: 118 MMHG | OXYGEN SATURATION: 97 %

## 2024-12-05 DIAGNOSIS — R63.4 ABNORMAL WEIGHT LOSS: ICD-10-CM

## 2024-12-05 PROCEDURE — 99213 OFFICE O/P EST LOW 20 MIN: CPT

## 2024-12-16 ENCOUNTER — APPOINTMENT (OUTPATIENT)
Dept: CARDIOLOGY | Facility: CLINIC | Age: 74
End: 2024-12-16
Payer: MEDICARE

## 2024-12-16 VITALS
OXYGEN SATURATION: 99 % | DIASTOLIC BLOOD PRESSURE: 70 MMHG | SYSTOLIC BLOOD PRESSURE: 100 MMHG | HEIGHT: 63 IN | HEART RATE: 80 BPM | WEIGHT: 135 LBS | BODY MASS INDEX: 23.92 KG/M2

## 2024-12-16 DIAGNOSIS — R94.31 ABNORMAL ELECTROCARDIOGRAM [ECG] [EKG]: ICD-10-CM

## 2024-12-16 DIAGNOSIS — R00.2 PALPITATIONS: ICD-10-CM

## 2024-12-16 PROCEDURE — G2211 COMPLEX E/M VISIT ADD ON: CPT

## 2024-12-16 PROCEDURE — 99205 OFFICE O/P NEW HI 60 MIN: CPT

## 2024-12-19 DIAGNOSIS — F32.A DEPRESSION, UNSPECIFIED: ICD-10-CM

## 2024-12-19 RX ORDER — ESCITALOPRAM OXALATE 5 MG/1
5 TABLET ORAL
Qty: 30 | Refills: 0 | Status: ACTIVE | COMMUNITY
Start: 2024-12-19 | End: 1900-01-01

## 2025-01-03 ENCOUNTER — APPOINTMENT (OUTPATIENT)
Dept: INTERNAL MEDICINE | Facility: CLINIC | Age: 75
End: 2025-01-03
Payer: MEDICARE

## 2025-01-03 VITALS
HEIGHT: 63 IN | SYSTOLIC BLOOD PRESSURE: 122 MMHG | WEIGHT: 135 LBS | HEART RATE: 64 BPM | BODY MASS INDEX: 23.92 KG/M2 | DIASTOLIC BLOOD PRESSURE: 78 MMHG | TEMPERATURE: 97.6 F | OXYGEN SATURATION: 98 % | RESPIRATION RATE: 16 BRPM

## 2025-01-03 DIAGNOSIS — F32.A DEPRESSION, UNSPECIFIED: ICD-10-CM

## 2025-01-03 DIAGNOSIS — R09.82 POSTNASAL DRIP: ICD-10-CM

## 2025-01-03 DIAGNOSIS — J45.901 UNSPECIFIED ASTHMA WITH (ACUTE) EXACERBATION: ICD-10-CM

## 2025-01-03 PROCEDURE — G0444 DEPRESSION SCREEN ANNUAL: CPT | Mod: 59

## 2025-01-03 PROCEDURE — 99214 OFFICE O/P EST MOD 30 MIN: CPT | Mod: 25

## 2025-01-07 RX ORDER — FLUOXETINE HYDROCHLORIDE 10 MG/1
10 CAPSULE ORAL
Qty: 30 | Refills: 0 | Status: ACTIVE | COMMUNITY
Start: 2025-01-07 | End: 1900-01-01

## 2025-01-15 ENCOUNTER — RESULT REVIEW (OUTPATIENT)
Age: 75
End: 2025-01-15

## 2025-01-17 ENCOUNTER — RESULT REVIEW (OUTPATIENT)
Age: 75
End: 2025-01-17

## 2025-01-17 ENCOUNTER — NON-APPOINTMENT (OUTPATIENT)
Age: 75
End: 2025-01-17

## 2025-01-27 ENCOUNTER — APPOINTMENT (OUTPATIENT)
Dept: FAMILY MEDICINE | Facility: CLINIC | Age: 75
End: 2025-01-27
Payer: MEDICARE

## 2025-01-27 ENCOUNTER — MED ADMIN CHARGE (OUTPATIENT)
Age: 75
End: 2025-01-27

## 2025-01-27 VITALS
TEMPERATURE: 97.5 F | HEART RATE: 73 BPM | OXYGEN SATURATION: 96 % | RESPIRATION RATE: 16 BRPM | DIASTOLIC BLOOD PRESSURE: 74 MMHG | SYSTOLIC BLOOD PRESSURE: 110 MMHG

## 2025-01-27 DIAGNOSIS — L98.9 DISORDER OF THE SKIN AND SUBCUTANEOUS TISSUE, UNSPECIFIED: ICD-10-CM

## 2025-01-27 DIAGNOSIS — M81.0 AGE-RELATED OSTEOPOROSIS W/OUT CURRENT PATHOLOGICAL FRACTURE: ICD-10-CM

## 2025-01-27 DIAGNOSIS — Z00.00 ENCOUNTER FOR GENERAL ADULT MEDICAL EXAMINATION W/OUT ABNORMAL FINDINGS: ICD-10-CM

## 2025-01-27 PROCEDURE — 90715 TDAP VACCINE 7 YRS/> IM: CPT

## 2025-01-27 PROCEDURE — 90471 IMMUNIZATION ADMIN: CPT

## 2025-01-27 PROCEDURE — G0439: CPT

## 2025-01-27 PROCEDURE — 99204 OFFICE O/P NEW MOD 45 MIN: CPT | Mod: 25

## 2025-01-27 PROCEDURE — 11104 PUNCH BX SKIN SINGLE LESION: CPT

## 2025-01-27 RX ORDER — ALENDRONATE SODIUM 70 MG/1
70 TABLET ORAL
Qty: 4 | Refills: 3 | Status: ACTIVE | COMMUNITY
Start: 2025-01-27 | End: 1900-01-01

## 2025-02-04 ENCOUNTER — RX RENEWAL (OUTPATIENT)
Age: 75
End: 2025-02-04

## 2025-02-06 ENCOUNTER — NON-APPOINTMENT (OUTPATIENT)
Age: 75
End: 2025-02-06

## 2025-02-06 LAB — CORE LAB BIOPSY: NORMAL

## 2025-04-07 ENCOUNTER — RESULT REVIEW (OUTPATIENT)
Age: 75
End: 2025-04-07

## 2025-05-02 ENCOUNTER — RX RENEWAL (OUTPATIENT)
Age: 75
End: 2025-05-02

## 2025-05-08 ENCOUNTER — NON-APPOINTMENT (OUTPATIENT)
Age: 75
End: 2025-05-08

## 2025-05-20 ENCOUNTER — RX RENEWAL (OUTPATIENT)
Age: 75
End: 2025-05-20

## 2025-09-08 ENCOUNTER — APPOINTMENT (OUTPATIENT)
Dept: FAMILY MEDICINE | Facility: CLINIC | Age: 75
End: 2025-09-08
Payer: MEDICARE

## 2025-09-08 ENCOUNTER — LABORATORY RESULT (OUTPATIENT)
Age: 75
End: 2025-09-08

## 2025-09-08 VITALS — DIASTOLIC BLOOD PRESSURE: 71 MMHG | SYSTOLIC BLOOD PRESSURE: 128 MMHG

## 2025-09-08 VITALS
TEMPERATURE: 98.3 F | DIASTOLIC BLOOD PRESSURE: 69 MMHG | HEART RATE: 70 BPM | RESPIRATION RATE: 16 BRPM | BODY MASS INDEX: 25.16 KG/M2 | HEIGHT: 63 IN | WEIGHT: 142 LBS | OXYGEN SATURATION: 98 % | SYSTOLIC BLOOD PRESSURE: 146 MMHG

## 2025-09-08 DIAGNOSIS — J45.21 MILD INTERMITTENT ASTHMA WITH (ACUTE) EXACERBATION: ICD-10-CM

## 2025-09-08 DIAGNOSIS — Z87.898 PERSONAL HISTORY OF OTHER SPECIFIED CONDITIONS: ICD-10-CM

## 2025-09-08 DIAGNOSIS — R25.2 CRAMP AND SPASM: ICD-10-CM

## 2025-09-08 DIAGNOSIS — F32.A DEPRESSION, UNSPECIFIED: ICD-10-CM

## 2025-09-08 DIAGNOSIS — J45.909 UNSPECIFIED ASTHMA, UNCOMPLICATED: ICD-10-CM

## 2025-09-08 DIAGNOSIS — R09.82 POSTNASAL DRIP: ICD-10-CM

## 2025-09-08 DIAGNOSIS — Z87.828 PERSONAL HISTORY OF OTHER (HEALED) PHYSICAL INJURY AND TRAUMA: ICD-10-CM

## 2025-09-08 DIAGNOSIS — J45.901 UNSPECIFIED ASTHMA WITH (ACUTE) EXACERBATION: ICD-10-CM

## 2025-09-08 DIAGNOSIS — M81.0 AGE-RELATED OSTEOPOROSIS W/OUT CURRENT PATHOLOGICAL FRACTURE: ICD-10-CM

## 2025-09-08 PROCEDURE — 99214 OFFICE O/P EST MOD 30 MIN: CPT | Mod: 25

## 2025-09-09 LAB
ANION GAP SERPL CALC-SCNC: 10 MMOL/L
BASOPHILS # BLD AUTO: 0.03 K/UL
BASOPHILS NFR BLD AUTO: 0.6 %
BUN SERPL-MCNC: 10 MG/DL
CALCIUM SERPL-MCNC: 9.4 MG/DL
CHLORIDE SERPL-SCNC: 106 MMOL/L
CO2 SERPL-SCNC: 25 MMOL/L
CREAT SERPL-MCNC: 0.5 MG/DL
EGFRCR SERPLBLD CKD-EPI 2021: 99 ML/MIN/1.73M2
EOSINOPHIL # BLD AUTO: 0.12 K/UL
EOSINOPHIL NFR BLD AUTO: 2.4 %
FERRITIN SERPL-MCNC: 101 NG/ML
GLUCOSE SERPL-MCNC: 105 MG/DL
HCT VFR BLD CALC: 36.6 %
HGB BLD-MCNC: 11.7 G/DL
IMM GRANULOCYTES NFR BLD AUTO: 0 %
LYMPHOCYTES # BLD AUTO: 1.47 K/UL
LYMPHOCYTES NFR BLD AUTO: 29.5 %
MAN DIFF?: NORMAL
MCHC RBC-ENTMCNC: 32 G/DL
MCHC RBC-ENTMCNC: 32.6 PG
MCV RBC AUTO: 101.9 FL
MONOCYTES # BLD AUTO: 0.37 K/UL
MONOCYTES NFR BLD AUTO: 7.4 %
NEUTROPHILS # BLD AUTO: 3 K/UL
NEUTROPHILS NFR BLD AUTO: 60.1 %
PLATELET # BLD AUTO: 232 K/UL
POTASSIUM SERPL-SCNC: 4.6 MMOL/L
RBC # BLD: 3.59 M/UL
RBC # FLD: 16.1 %
SODIUM SERPL-SCNC: 141 MMOL/L
WBC # FLD AUTO: 4.99 K/UL

## 2025-09-12 RX ORDER — FLUTICASONE PROPIONATE AND SALMETEROL XINAFOATE 115; 21 UG/1; UG/1
115-21 AEROSOL, METERED RESPIRATORY (INHALATION) TWICE DAILY
Qty: 1 | Refills: 3 | Status: DISCONTINUED | COMMUNITY
Start: 2025-09-08 | End: 2025-09-12

## 2025-09-16 RX ORDER — FLUTICASONE FUROATE 100 UG/1
100 POWDER RESPIRATORY (INHALATION) DAILY
Qty: 1 | Refills: 0 | Status: ACTIVE | COMMUNITY
Start: 2025-09-16 | End: 1900-01-01

## 2025-09-16 RX ORDER — FLUTICASONE FUROATE 100 UG/1
100 POWDER RESPIRATORY (INHALATION) DAILY
Qty: 1 | Refills: 3 | Status: COMPLETED | COMMUNITY
Start: 2025-09-12 | End: 2025-09-16